# Patient Record
Sex: MALE | Race: BLACK OR AFRICAN AMERICAN | Employment: FULL TIME | URBAN - METROPOLITAN AREA
[De-identification: names, ages, dates, MRNs, and addresses within clinical notes are randomized per-mention and may not be internally consistent; named-entity substitution may affect disease eponyms.]

---

## 2017-08-29 ENCOUNTER — HOSPITAL ENCOUNTER (OUTPATIENT)
Age: 52
Discharge: HOME | End: 2017-08-29
Payer: COMMERCIAL

## 2017-08-29 ENCOUNTER — HOSPITAL ENCOUNTER (OUTPATIENT)
Dept: GENERAL RADIOLOGY | Age: 52
Discharge: HOME | End: 2017-08-29
Payer: COMMERCIAL

## 2017-08-29 DIAGNOSIS — T14.90XA INJURY: ICD-10-CM

## 2017-08-29 PROCEDURE — 73130 X-RAY EXAM OF HAND: CPT

## 2018-07-31 ENCOUNTER — HOSPITAL ENCOUNTER (OUTPATIENT)
Dept: PHYSICAL THERAPY | Age: 53
Setting detail: THERAPIES SERIES
Discharge: HOME | End: 2018-07-31
Payer: COMMERCIAL

## 2018-07-31 PROCEDURE — G0283 ELEC STIM OTHER THAN WOUND: HCPCS

## 2018-07-31 PROCEDURE — 97162 PT EVAL MOD COMPLEX 30 MIN: CPT

## 2018-07-31 PROCEDURE — 97110 THERAPEUTIC EXERCISES: CPT

## 2018-07-31 NOTE — CONSULTS
[x] Rina Pj        Outpatient Physical                Therapy       [address]       Phone: [phone]       Fax: [phone]       Physical Therapy Spine Evaluation    Date:  2018  Patient: Chacha Fountain  : 1965  MRN: [id number]  Physician: Daniel 95: WC (6 Rx approved)  Medical Diagnosis: Low Back Strain S39.012  Rehab Codes: M54.5, M54.6, R26.2, R29.3  Onset Date: 2018  Next 's appt.: Friday 8/3/2018    Subjective:   CC: Pt injured back at work yesterday, was pulling up on gopher (approx 50lbs), went to Dr later that afternoon, Dr prescribed flexeril and ibuprofen, Pt took last night, woke up this morning with excruciating pain. Pain is in R low back, shoots into R side, and around to front. Pain increases when bends over, and when tries to get up after sitting for awhile, up to 10/10. Pt has tried using ice without relief. Pain decreases with meds and laying down.     HPI: (onset date) 2018    PMHx: [] Unremarkable [] Diabetes [] HTN  [] Pacemaker   [] MI/Heart Problems [] Cancer [x] Arthritis   [x] Other:RC repair with scope ; hernia repair;               [x] Refer to full medical chart  In EPIC   Tests: [] X-Ray: [] MRI:  [] Other:    Medications: [x] Refer to full medical record [] None [] Other:  Allergies:      [] Refer to full medical record [] None [x] Other: Bee Stings    Function:  Hand Dominance  [x] Right  [] Left  Working:  [] Normal Duty  [x] Light Duty  [] Off D/T Condition  [] Retired  [] Not Employed                  []  Disability  [] Other:           Return to work:   Job/ADL Description: [employer] physical job, wrenching, lifting, digging, bending; currently light duty-no lifting, bending  Pain:  [x] Yes  [] No Location: R low back Pain Rating: (0-10 scale) 8/10 w/meds  Pain altered Tx:  [] Yes  [x] No  Action:  Symptoms:  [] Improving [x] Worsening [] Same  Better:  [] AM    [] PM    [] Sit    [] Deviations  [] Other:    STG: (to be met in 6 treatments)  1. ? Pain: R low back 7/10 at worst, 5/10 on average  2. ? ROM: Lumbar flex 50°, ext 20° without increased pain  3. ? Strength: B hips 4/5, B knees 4+/5, B ankles 5/5; Spinal stab muscles as evidence by ability to perform moderate spinal stab ex program   4. ? Function: Oswestry score 50% loss of function  5. Independent with Home Exercise Programs      Patient goals: \"Go back to regular work habits\"      Rehab Potential:  [x] Good  [] Fair  [] Poor   Suggested Professional Referral:  [x] No  [] Yes:  Barriers to Goal Achievement[de-identified]  [x] No  [] Yes:  Domestic Concerns:  [x] No  [] Yes:    Pt. Education:  [x] Plans/Goals, Risks/Benefits discussed  [] Home exercise program  Method of Education: [x] Verbal  [] Demo  [] Written  Comprehension of Education:  [x] Verbalizes understanding. [] Demonstrates understanding. [] Needs Review. [] Demonstrates/verbalizes understanding of HEP/Ed previously given. Treatment Plan:  [x] Therapeutic Exercise    [x] Modalities:  [x] Therapeutic Activity    [x] Ultrasound  [x] Electrical Stimulation  [] Gait Training     []Massage       [x] Lumbar/Cervical Traction  [x] Neuromuscular Re-education [x] Cold/hotpack [] Iontophoresis: 4 mg/mL  [x] Instruction in HEP             Dexamethasone Sodium  [x] Manual Therapy             Phosphate 40-80 mAmin  [] Aquatic Therapy   [] Dry Needling [] Other:    []  Medication allergies reviewed for use of    Dexamethasone Sodium Phosphate 4mg/ml     with iontophoresis treatments. Pt is not allergic.     Frequency:  3 x/week for 6 visits    Todays Treatment:  Modalities: CP (large) ES (IFC opiate) in supine w/purple wedge, at end of Rx  Precautions:  Exercises:  Exercise Reps/ Time Weight/ Level Comments   Supine      abdom louie 10x 5sec    Glut sets  10x     Alt UE raises [id number]  W/abdom louie   March  15x  W/abdom louie         Other:    Specific Instructions for next treatment: add

## 2018-08-01 ENCOUNTER — HOSPITAL ENCOUNTER (OUTPATIENT)
Dept: PHYSICAL THERAPY | Age: 53
Setting detail: THERAPIES SERIES
Discharge: HOME | End: 2018-08-01
Payer: COMMERCIAL

## 2018-08-01 PROCEDURE — G0283 ELEC STIM OTHER THAN WOUND: HCPCS

## 2018-08-01 PROCEDURE — 97110 THERAPEUTIC EXERCISES: CPT

## 2018-08-01 NOTE — FLOWSHEET NOTE
[] [facility]       Outpatient Physical        Therapy       [address]       Phone: [phone]       Fax: [phone] [] [facility] for Health Promotion at [address] [facility]       Phone: [phone]       Fax: [phone] [] [facility] for [facility] Promotion  [address] [facility]   Phone: [phone]   Fax:  [phone]     Physical Therapy Daily Treatment Note    Date:  2018  Patient Name:  Estuardo Cheung    :  1965  MRN: [id number]  Physician: Dayna Merchant Ave: BRAYDEN (6 Rx approved)  Medical Diagnosis: Low Back Strain S39.012                     Rehab Codes: M54.5, M54.6, R26.2, R29.3  Onset Date: 2018                      Next 's appt.: Friday 8/3/2018  Visit# / total visits: 2/6  Cancels/No Shows: 0/0    Subjective:    Pain:  [x] Yes  [] No           Location: R low back   Pain Rating: (0-10 scale) 8/10   Pain altered Tx:  [] Yes  [x] No  Action:  Comments: Patient reports pain is still significant in lower back, R side more than L. Feels it is more than just a muscle issue, feels it is something deeper. Following initial eval pt states symptoms were improved following appt but then set on later at night once he was up and moving around again.      Objective:  Modalities: CP (large) ES (IFC opiate) in supine w/purple wedge, at end of Rx x 15min  Precautions:  Exercises:  Exercise Reps/ Time Weight/ Level Comments    SciFit 5 min L1  x          Supine       TrA iso 10x5\"   x   Glut set 10x3\"   x   Alt UE 15x  With iso abdom x   Alt march 15x  iso abdom x   Alt UE/LE opp side 15x  iso abdom x   Bridge 10x   x   HS stretch 3x20\"  With belt x   SKTC 10x3\"   x   LTR 10x   x   Hip adduction 10x3\"  Yellow ball x          Seated        March 10x ea  With TrA iso, add next                         Other:    Specific Instructions for next treatment:    Treatment Charges: Mins Units   [x]

## 2018-08-03 ENCOUNTER — HOSPITAL ENCOUNTER (OUTPATIENT)
Dept: PHYSICAL THERAPY | Age: 53
Setting detail: THERAPIES SERIES
Discharge: HOME | End: 2018-08-03
Payer: COMMERCIAL

## 2018-08-03 PROCEDURE — 97140 MANUAL THERAPY 1/> REGIONS: CPT

## 2018-08-03 PROCEDURE — 97110 THERAPEUTIC EXERCISES: CPT

## 2018-08-03 PROCEDURE — G0283 ELEC STIM OTHER THAN WOUND: HCPCS

## 2018-08-03 NOTE — FLOWSHEET NOTE
[x] [facility]       Outpatient Physical        Therapy       [address]       Phone: [phone]       Fax: [phone] [] [facility] for Health Promotion at [address] [facility]       Phone: [phone]       Fax: [phone] [] [facility] [place] for Health Promotion  [address] [facility]   Phone: [phone]   Fax:  [phone]     Physical Therapy Daily Treatment Note    Date:  8/3/2018  Patient Name:  Sharif Hendrickson    :  1965  MRN: [id number]  Physician: Dayna Merchant Ave: BRAYDEN (6 Rx approved)  Medical Diagnosis: Low Back Strain S39.012                     Rehab Codes: M54.5, M54.6, R26.2, R29.3  Onset Date: 2018                      Next 's appt.: Friday 8/3/2018  Visit# / total visits: 3/6  Cancels/No Shows: 0/0    Subjective:    Pain:  [x] Yes  [] No           Location: R low back   Pain Rating: (0-10 scale) 5/10   Pain altered Tx:  [] Yes  [x] No  Action:  Comments: Patient reports increased pain in the morning and with rotational movements. Pt states that pain is neither improving or getting worse. Objective:  Modalities: CP (large) ES (IFC opiate) in supine w/purple wedge, at end of Rx x 15min  Precautions:  Exercises:  Exercise Reps/ Time Weight/ Level Comments    SciFit 6 min L2.6  x          Supine       TrA iso 10x10\"   x   Glut set 10x3\"   x   Alt UE 15x  With iso abdom x   Alt march 15x  iso abdom x   Alt UE/LE opp side 15x  iso abdom x   Bridge 15x 2\" hold  x   HS stretch 3x30\"   x   SKTC/ DKTC 10x3\"   x   LTR 10x ea 5\" hold  x   Hip adduction 10x3\"  Yellow ball x          PRONE        Prone Lying 2'   x   Hip Extension 10x ea  With TrA iso, add next x   Prone press ups 10x2\"   x                 Manual Tx: Pelvic Traction in hooklying with mobility belt 5x45\", BLE elevated.  Posterior/ Anterior Lumbar Spine mobilization    Specific Instructions for next treatment: Increase

## 2018-08-06 ENCOUNTER — HOSPITAL ENCOUNTER (OUTPATIENT)
Dept: PHYSICAL THERAPY | Age: 53
Setting detail: THERAPIES SERIES
Discharge: HOME | End: 2018-08-06
Payer: COMMERCIAL

## 2018-08-06 PROCEDURE — 97012 MECHANICAL TRACTION THERAPY: CPT

## 2018-08-06 PROCEDURE — 97110 THERAPEUTIC EXERCISES: CPT

## 2018-08-06 NOTE — FLOWSHEET NOTE
[x] A[facility]       Outpatient Physical        Therapy       [address]       Phone: [phone]       Fax: [phone]     Physical Therapy Daily Treatment Note    Date:  2018  Patient Name:  Kerri Arzola      :  1965    MRN: [id number]  Physician: Ondina [place]: WC (6 Rx approved)  Medical Diagnosis: Low Back Strain S39.012                       Rehab Codes: M54.5, M54.6, R26.2, R29.3  Onset Date: 2018                      Next 's appt.: Friday 8/10/2018    Visit# / total visits: 4/6  Cancels/No Shows: 0/0    Subjective:    Pain:  [x] Yes  [] No           Location: R low back     Pain Rating: (0-10 scale) 5/10   Pain altered Tx:  [] Yes  [x] No  Action:  Comments: Patient states low back pain has been waxing and waning. Felt good this morning, but after work, pain is again elevated. Objective:  Modalities: CP (large) ES (IFC opiate) in supine w/purple wedge, at end of Rx x 15min  Precautions:  Exercises:   Exercise Reps/ Time Weight/ Level Comments    SciFit 6 min L2.6  x          Supine       TrA iso 10x10\"   x   Glut set 10x3\"   x   Alt UE/LE Same Side 15x 2 lb With iso abdom x   Marching 15x 2 lb iso abdom x   Alt UE/LE opp side 15x 2 lb iso abdom x   Bridge 15x 2\" hold  x   HS stretch w/belt 3x30\"   x   SKTC/ DKTC 5x5\" ea   x   LTR 10x ea 5\" hold  x   Hip adduction 20x3\"  Yellow ball x          PRONE        Prone Lying 2'   x   Hip Extension 10x ea  With TrA iso, add next x   Prone press ups 10x2\"   x                 Manual Tx: Pelvic Traction in hooklying with mobility belt 5x45\", BLE elevated.  Posterior/ Anterior Lumbar Spine mobilization    Specific Instructions for next treatment: Increase core stabilization exercises,; standing (squats/4-way hip)    Treatment Charges: Mins Units   [x]  Modalities IFC/CP     [x]  Ther Exercise 30 2   [x]  Manual Therapy     []  Ther Activities     []  Aquatics     []  Vasocompression

## 2018-08-07 ENCOUNTER — HOSPITAL ENCOUNTER (OUTPATIENT)
Dept: PHYSICAL THERAPY | Age: 53
Setting detail: THERAPIES SERIES
Discharge: HOME | End: 2018-08-07
Payer: COMMERCIAL

## 2018-08-07 PROCEDURE — 97110 THERAPEUTIC EXERCISES: CPT

## 2018-08-07 PROCEDURE — 97012 MECHANICAL TRACTION THERAPY: CPT

## 2018-08-09 ENCOUNTER — HOSPITAL ENCOUNTER (OUTPATIENT)
Dept: PHYSICAL THERAPY | Age: 53
Setting detail: THERAPIES SERIES
Discharge: HOME | End: 2018-08-09
Payer: COMMERCIAL

## 2018-08-09 PROCEDURE — 97012 MECHANICAL TRACTION THERAPY: CPT

## 2018-08-09 PROCEDURE — 97110 THERAPEUTIC EXERCISES: CPT

## 2018-08-09 NOTE — FLOWSHEET NOTE
[x] [facility]       Outpatient Physical        Therapy       [address]       Phone: [phone]       Fax: [phone]     Physical Therapy Daily Treatment Note    Date:  2018  Patient Name:  Laura Gallardo      :  1965    MRN: [id number]  Physician: Ondina [place]: WC (6 Rx approved)  Medical Diagnosis: Low Back Strain S39.012                       Rehab Codes: M54.5, M54.6, R26.2, R29.3  Onset Date: 2018                      Next 's appt.: Friday 8/10/2018    Visit# / total visits: 6/  Cancels/No Shows: 0/0    Subjective:    Pain:  [x] Yes  [] No           Location: R low back     Pain Rating: (0-10 scale) 3/10   Pain altered Tx:  [] Yes  [x] No  Action:  Comments: Sees  tomorrow. Reports he still wakes up in pain. Has to take pain pill to take pain way, but the decrease in pain is slowly improving overall. Reports compliance with HEP.       Objective:  Modalities: CP (large) ES (IFC opiate) in supine w/purple wedge, at end of Rx x 15 min held 18  Mechanical Lumbar Traction: 80 lbs max, 20 lbs min, 30 sec on/10 sec off, 2 steps 100% speed  Precautions:  Exercises:   Exercise Reps/ Time Weight/ Level Comments    SciFit 6 min L2.6  X          Standing        4-Way Hip 12x ea Red Added 18 X          Total Gym       Squats 2x10  Blue tband around knees  Added 18 X          Supine       TrA iso 20x5\"   X   Glut set 20x5\"   X   Alt UE/LE Same Side 2x10 2 lb Increased reps 18    Alt UE/LE opp side 2x10 2 lb Increased reps 18    Bridge 2x10 2\" hold     HS stretch w/belt 3x30\"      SKTC/ DKTC 5x15\" ea      LTR 10x ea 5\" hold     Hip adduction 20x3\"  Yellow ball    SLR 15x 2 lb Added 18           Sidelying        Hip Abduction  15x 2 lb Added 18           PRONE        MATTHEW 3 min   X   Alt Hip Extension 15x ea  With TrA iso, add next X   Prone press ups 2x10 3 sec hold  X                 Manual Tx:     Specific Instructions for next treatment: Increase core stabilization exercises,; address STG's    Treatment Charges: Mins Units   [x]  Modalities IFC/CP     [x]  Ther Exercise 40 3   [x]  Manual Therapy     []  Ther Activities     []  Aquatics     []  Vasocompression     [x]  Lumbar Traction 20 1   Total Treatment time 60 4        Assessment: [x] Progressing toward goals: Good review of exercise program this date. Pt demos good recall of routine. Pt still demonstrates some functional deficits as indicted by mm weakness and as indicated on Oswestry. Pt would benefit from continued PT to address these deficits. Pt educated to stop by clinic after visit with MD and inform us whether he will be released from PT or not. [] No change. [x] Other:    STG: (to be met in 6 treatments)  1. ? Pain: R low back 7/10 at worst, 5/10 on average PROGRESSING over last week for max pain (8/10) average pain is at 3-4/10 over last week  2. ? ROM: Lumbar flex 50°, ext 20° without increased pain  MET 60° 20°  3. ? Strength: B hips 4/5, B knees 4+/5, B ankles 5/5; Spinal stab muscles as evidence by ability to perform moderate spinal stab ex program progress made 4/5 grossly for hips and knees, ankles 4+/5  4. ? Function: Oswestry score 50% loss of function  MET 48%  5. Independent with Home Exercise Programs MET        Patient goals: \"Go back to regular work habits\"    Pt. Education:  [x] Yes  [] No  [x] Reviewed Prior HEP/Ed  Method of Education: [x] Verbal  [] Demo  [x] Written  Comprehension of Education:  [x] Verbalizes understanding. [] Demonstrates understanding. [] Needs review. [x] Demonstrates/verbalizes HEP/Ed previously given  8/1/18 - issued written HEP for glut iso, SKTC, HS stretch, TrA iso  Re-issued previous HEP, prone press ups, prone hip extension,  DLS (marching, alt opp UE/LE, SLR, bridge, heel walkouts, sidelying hip abduction) standing 4-way hip with green tband     Plan: [x] Continue per plan of care.    [] Other:     Frequency:  3 x/week for 6 visits     Time In: 1231           Time Out: 7208    Electronically signed by:  Ariana Bartholomew PTA

## 2018-08-21 ENCOUNTER — HOSPITAL ENCOUNTER (OUTPATIENT)
Age: 53
Discharge: HOME | End: 2018-08-21
Payer: COMMERCIAL

## 2019-03-15 ENCOUNTER — HOSPITAL ENCOUNTER (OUTPATIENT)
Dept: PHYSICAL THERAPY | Age: 54
Setting detail: THERAPIES SERIES
Discharge: HOME | End: 2019-03-15
Payer: COMMERCIAL

## 2019-03-15 PROCEDURE — 97110 THERAPEUTIC EXERCISES: CPT

## 2019-03-15 PROCEDURE — 97162 PT EVAL MOD COMPLEX 30 MIN: CPT

## 2019-03-19 ENCOUNTER — HOSPITAL ENCOUNTER (OUTPATIENT)
Dept: PHYSICAL THERAPY | Age: 54
Setting detail: THERAPIES SERIES
Discharge: HOME | End: 2019-03-19
Payer: COMMERCIAL

## 2019-03-19 PROCEDURE — 97110 THERAPEUTIC EXERCISES: CPT

## 2019-03-20 ENCOUNTER — HOSPITAL ENCOUNTER (OUTPATIENT)
Dept: PHYSICAL THERAPY | Age: 54
Setting detail: THERAPIES SERIES
Discharge: HOME | End: 2019-03-20
Payer: COMMERCIAL

## 2019-03-20 PROCEDURE — 97110 THERAPEUTIC EXERCISES: CPT

## 2019-03-22 ENCOUNTER — HOSPITAL ENCOUNTER (OUTPATIENT)
Dept: PHYSICAL THERAPY | Age: 54
Setting detail: THERAPIES SERIES
Discharge: HOME | End: 2019-03-22
Payer: COMMERCIAL

## 2019-03-22 PROCEDURE — G0283 ELEC STIM OTHER THAN WOUND: HCPCS

## 2019-03-22 PROCEDURE — 97110 THERAPEUTIC EXERCISES: CPT

## 2019-03-26 ENCOUNTER — HOSPITAL ENCOUNTER (OUTPATIENT)
Dept: PHYSICAL THERAPY | Age: 54
Setting detail: THERAPIES SERIES
Discharge: HOME | End: 2019-03-26
Payer: COMMERCIAL

## 2019-03-26 PROCEDURE — G0283 ELEC STIM OTHER THAN WOUND: HCPCS

## 2019-03-26 PROCEDURE — 97110 THERAPEUTIC EXERCISES: CPT

## 2019-03-27 ENCOUNTER — HOSPITAL ENCOUNTER (OUTPATIENT)
Dept: PHYSICAL THERAPY | Age: 54
Setting detail: THERAPIES SERIES
Discharge: HOME | End: 2019-03-27
Payer: COMMERCIAL

## 2019-03-27 PROCEDURE — G0283 ELEC STIM OTHER THAN WOUND: HCPCS

## 2019-03-27 PROCEDURE — 97110 THERAPEUTIC EXERCISES: CPT

## 2019-03-29 ENCOUNTER — HOSPITAL ENCOUNTER (OUTPATIENT)
Dept: PHYSICAL THERAPY | Age: 54
Setting detail: THERAPIES SERIES
Discharge: HOME | End: 2019-03-29
Payer: COMMERCIAL

## 2019-04-02 ENCOUNTER — HOSPITAL ENCOUNTER (OUTPATIENT)
Dept: PHYSICAL THERAPY | Age: 54
Setting detail: THERAPIES SERIES
Discharge: HOME | End: 2019-04-02
Payer: COMMERCIAL

## 2019-04-02 PROCEDURE — 97110 THERAPEUTIC EXERCISES: CPT

## 2019-04-02 PROCEDURE — G0283 ELEC STIM OTHER THAN WOUND: HCPCS

## 2019-04-02 NOTE — FLOWSHEET NOTE
[x] [facility]) [facility] &  Therapy  [address]  [phone]  F: [phone] [] [address]  [facility]   [address]  P: [phone]  F: [phone] [] [place]  [address] [facility]  P: [phone]  F: [phone] [] [address]  [facility]   [address]  [place]: [phone]  F: [phone]     Physical Therapy Daily Treatment Note    Date:  2019  Patient Name:  Herbert Tatum    :  1965  MRN: [id number]  Physician: Dr. Lauren Herrera: [employer] ([place]- 3x week/x3 week- 9 visits total; no date range)  Medical Diagnosis: S39.012             Rehab Codes: S39.012  Onset Date: 3/14/2019                      Next 's appt. : 3/28/2019  Visit# / total visits: 7/9  Cancels/No Shows: 0/0    Subjective:    Pain:  [x] Yes  [] No Location: low back Pain Rating: (0-10 scale) 4/10  Pain altered Tx:  [x] No  [] Yes  Action:  Comments: States that his back actually feels a little better this afternoon.        Objective:  Modalities: 1 large HP and UES to lower back with pt supine and legs propped on wedge after exercises  Precautions: [employer] ([facility]- 3x week/x3 week- 9 visits total; no date range); light duty upon RTW 3/27/2019  Exercises:  Exercise Reps/ Time Weight/ Level Comments   Supine      iso abdominal  10x 5 sec    SKTC   10x 5 sec    Piriformis stretch   10x 5 sec    Marching   10x 2lb iso abd   LTR 10x 5 sec    Bridges   15x     SLR    10x 2lb    Opposite UE/LE    10x 2/2lb    Hamstring stretch   3xea 30\"    Manual piriformis stretch   Therapist         Prone            MATTHEW 45\" x3     Prayer stretch 3x 15 sec    Prayer stretch hands to right 3x 15\"    Prone alt leg lifts 10xea 2lb    Angry cat/Cow stretch 5xea 5\"          Cybex

## 2019-04-03 ENCOUNTER — HOSPITAL ENCOUNTER (OUTPATIENT)
Dept: PHYSICAL THERAPY | Age: 54
Setting detail: THERAPIES SERIES
Discharge: HOME | End: 2019-04-03
Payer: COMMERCIAL

## 2019-04-03 PROCEDURE — G0283 ELEC STIM OTHER THAN WOUND: HCPCS

## 2019-04-03 PROCEDURE — 97110 THERAPEUTIC EXERCISES: CPT

## 2019-04-03 NOTE — FLOWSHEET NOTE
[x] [facility]) [facility] &  Therapy  [address]  [phone]  F: [phone] [] [address]  [place] [address]   [address]  P: [phone]  F: [phone] [] [place]  [address]  P: [phone]  F: [phone] [] [address]  [facility]   [address]  [place]: [phone]  F: [phone]     Physical Therapy Daily Treatment Note    Date:  4/3/2019  Patient Name:  Caio Oakes    :  1965  MRN: [id number]  Physician: Dr. Daron Santana: [employer] ([address] [facility]- 3x week/x3 week- 9 visits total; no date range)  Medical Diagnosis: S39.012             Rehab Codes: S39.012  Onset Date: 3/14/2019                      Next 's appt. : 3/28/2019  Visit# / total visits: 8/9  Cancels/No Shows: 0/0    Subjective:    Pain:  [x] Yes  [] No Location: low back Pain Rating: (0-10 scale) 3-4/10  Pain altered Tx:  [x] No  [] Yes  Action:  Comments: States that his pain is getting better. Reports having new onset of left-sided flank pain this afternoon.       Objective:  Modalities: 1 large HP and UES to lower back with pt supine and legs propped on wedge after exercises  Precautions: [employer] ([facility]- 3x week/x3 week- 9 visits total; no date range); light duty upon RTW 3/27/2019  Exercises:  Exercise Reps/ Time Weight/ Level Comments   Supine      iso abdominal  10x 5 sec    SKTC   10x 5 sec    Piriformis stretch   10x 5 sec    Marching   10x 2lb iso abd   LTR 10x 5 sec    Bridges   15x     SLR    10x 2lb    Opposite UE/LE    10x 2/2lb    Hamstring stretch   3xea 30\"    Manual piriformis stretch   Therapist         Prone            MATTHEW 45\" x3     Prayer stretch 3x 15 sec    Prayer stretch hands to right 3x 15\"    Prone alt leg lifts 10xea 2lb    Angry cat/Cow stretch 5xea 5\"          Cybex back ext 2x15 2plates    Standing hip glides  10x 5\" Shift hips to right   Other:    Specific Instructions for next treatment: Reassessment next session    Treatment Charges: Mins Units   [x]  Modalities--HP/UES 15/15 0/1   [x]  Ther Exercise 35 2   []  Manual Therapy     []  Ther Activities     []  Aquatics     []  Vasocompression     []  Other     Total Treatment time 50 mins 3       Assessment: [x] Progressing toward goals. Pt moving around the clinic with minimal to no guarding now. Pt also progressing well with DLS exercises. [] No change. [] Other:     STG: (to be met in 9  treatments)  1. ? Pain: Patient will report 3/10 pain at rest and < 5/10 pain with active movement in order to improve QOL, work tolerance. 2. ? ROM: Patient will demonstrate 50% forward lumbar flexion; beyond neutral lumbar extension with < 5/10 pain for improved mobility, ADLs, work tasks. 3. ? Strength: Patient will demonstrate 4/5 L hip flexor, 4+/5 L quadriceps strength with < 5/10 pain for improved ADLs, work tolerance. 4. ? Function: Patient will report < 5/10 pain with 15 minutes of sitting, standing, ambulation for improved work tolerance. 5. Independent with Home Exercise Programs--MET  6. Demonstrate Knowledge of fall prevention  7. Improve modified oswestry from 70% to 50%     Patient goals: \"get rid of the pain. \"    Pt. Education:  [x] Yes  [] No  [x] Reviewed Prior HEP/Ed  Method of Education: [x] Verbal  [x] Demo  [] Written  Comprehension of Education:  [x] Verbalizes understanding. [x] Demonstrates understanding. [x] Needs review. [] Demonstrates/verbalizes HEP/Ed previously given. Plan: [x] Continue per plan of care.    [] Other:      Time In: 3:38 pm            Time Out: 4:26 pm    Electronically signed by:  Colton Nieves, PT

## 2019-04-05 ENCOUNTER — HOSPITAL ENCOUNTER (OUTPATIENT)
Dept: PHYSICAL THERAPY | Age: 54
Setting detail: THERAPIES SERIES
Discharge: HOME | End: 2019-04-05
Payer: COMMERCIAL

## 2019-04-05 NOTE — FLOWSHEET NOTE
[x] [address]  [address]    [phone]  F: [phone]   [] [address] [facility] [address]   [address]  P: [phone]  F: [phone]  [] [address]  [address] [facility]  P: [phone]  F: [phone]   [] [address]  [facility] [address]   [place]: [phone]  F: [phone]  [] [place] [id number] [facility] [address]  [place]: [phone]   F: [phone]     Physical Therapy Cancel/No Show note    Date: 2019  Patient: Dangelo Davidson  : 1965  MRN: [id number]    Cancels/No Shows to date:     For today's appointment patient:    []  Cancelled    [] Rescheduled appointment    [x] No-show     Reason given by patient:    []  Patient ill    []  Conflicting appointment    [] No transportation      [] Conflict with work    [] No reason given    [] Weather related    [] Other:      Comments: Called pt, he was very apologetic stating that he completely forgot about the appointment. Pt went back to the doctor yesterday and was released to go back to full duty at work after his vacation. Pt instructed to call to reschedule this appointment after his vacation if he'd like to make it up.           [] Next appointment was confirmed    Electronically signed by: Yandel Lo PT

## 2019-04-23 NOTE — DISCHARGE SUMMARY
quadriceps strength with < 5/10 pain for improved ADLs, work tolerance.   4. ? Dorean Led will report < 5/10 pain with 15 minutes of sitting, standing, ambulation for improved work tolerance. 5. Independent with Home Exercise Programs--MET  6. Demonstrate Knowledge of fall prevention  7. Improve modified oswestry from 70% to 50%        Treatment to Date:  [x] Therapeutic Exercise    [x] Modalities:  [] Therapeutic Activity    [] Ultrasound  [x] Electrical Stimulation  [] Gait Training     [] Massage       [] Lumbar/Cervical Traction  [] Neuromuscular Re-education [x] Cold/hotpack [] Iontophoresis: 4 mg/mL  [x] Instruction in Home Exercise Program                     Dexamethasone Sodium  [] Manual Therapy             Phosphate 40-80 mAmin  [] Aquatic Therapy                   [] Vasocompression/    [] Other:             Game Ready    Discharge Status:     [] Pt recovered from conditions. Treatment goals were met. [] Pt received maximum benefit. No further therapy indicated at this time. [] Pt to continue exercise/home instructions independently. [] Therapy interrupted due to:    [] Pt has 2 or more no shows/cancels, is discontinued per our policy. [] Pt has completed prescribed number of treatment sessions. [x] Other: Pt completed 8/9 sessions. Pt no-showed for his last scheduled appt on 4/5/19--called and spoke with the pt and he was very apologetic for missing, explained that he had just forgotten about the appt. Pt was leaving the next day for vacation for a week. Instructed the pt to call when he returned from vacation if he would like to reschedule his last appointment but that if he was doing well that it was not necessary. Electronically signed by Yandel Lo PT on 4/23/2019 at 8:19 AM      If you have any questions or concerns, please don't hesitate to call.   Thank you for your referral.

## 2019-06-18 ENCOUNTER — HOSPITAL ENCOUNTER (OUTPATIENT)
Dept: GENERAL RADIOLOGY | Age: 54
Discharge: HOME | End: 2019-06-20
Payer: COMMERCIAL

## 2019-06-18 ENCOUNTER — HOSPITAL ENCOUNTER (OUTPATIENT)
Age: 54
Discharge: HOME | End: 2019-06-18
Payer: COMMERCIAL
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
## 2019-06-18 PROCEDURE — 73562 X-RAY EXAM OF KNEE 3: CPT

## 2019-06-26 ENCOUNTER — HOSPITAL ENCOUNTER (OUTPATIENT)
Dept: MRI IMAGING | Facility: CLINIC | Age: 54
Discharge: HOME | End: 2019-06-28
Payer: COMMERCIAL

## 2019-06-26 DIAGNOSIS — T14.8XXA SPRAIN: ICD-10-CM

## 2019-06-26 PROCEDURE — 73721 MRI JNT OF LWR EXTRE W/O DYE: CPT

## 2019-07-16 DIAGNOSIS — M25.561 ACUTE PAIN OF RIGHT KNEE: Primary | ICD-10-CM

## 2019-07-18 ENCOUNTER — OFFICE VISIT (OUTPATIENT)
Dept: ORTHOPEDIC SURGERY | Age: 54
End: 2019-07-18
Payer: COMMERCIAL

## 2019-07-18 VITALS — HEIGHT: 73 IN | BODY MASS INDEX: 29.16 KG/M2 | WEIGHT: 220 LBS

## 2019-07-18 DIAGNOSIS — S83.91XA SPRAIN OF RIGHT KNEE, UNSPECIFIED LIGAMENT, INITIAL ENCOUNTER: Primary | ICD-10-CM

## 2019-07-18 PROCEDURE — 99243 OFF/OP CNSLTJ NEW/EST LOW 30: CPT | Performed by: ORTHOPAEDIC SURGERY

## 2019-07-18 RX ORDER — METHYLPREDNISOLONE 4 MG/1
TABLET ORAL
Qty: 1 KIT | Refills: 0 | Status: SHIPPED | OUTPATIENT
Start: 2019-07-18 | End: 2019-09-05

## 2019-07-18 RX ORDER — IBUPROFEN 800 MG/1
800 TABLET ORAL EVERY 6 HOURS PRN
COMMUNITY
End: 2021-05-11

## 2019-07-18 ASSESSMENT — ENCOUNTER SYMPTOMS
CONSTIPATION: 0
DIARRHEA: 0
NAUSEA: 0
COUGH: 0

## 2019-07-18 NOTE — PROGRESS NOTES
[facility]  [facility] ORTHO SPECIALISTS  [address] [place]  [address]  Dept: [phone]    Ambulatory Orthopedic Consult      CHIEF COMPLAINT:    Chief Complaint   Patient presents with    Knee Pain     right [facility]:6/18/2019       HISTORY OF PRESENT ILLNESS:      The patient is a 62 y.o. male who is being seen at the request of  Salvatore Hong DO for consultation and evaluation of  Right knee pain. Ariane Amin is a 62 y.o. male who presents to the office today with right knee pain after a work-related injury sustained on 6/18/19. Patient states he was coming down a ladder, which was in a hole, he went backwards with his knee bent. He states that his right foot was caught under him. Patient presented to Occupational health. A XR and MRI were completed. The patient was prescribed Ibuprofen 800mg PO TID. Patient states that he really only takes the medication in the morning and at night before bed. The patient states that the pain is located on the outside of the knee when he is bending. Patient states that occupational health, has him on light duty. He has a follow up with them on Monday (7/22/19). The patient does have a prior history of a right knee injury. Patient states that it was also work related. The injury was less than 5 years ago. He had a twisting injury to the right knee when coming down a ladder. Patient states that he did therapy and oral NSAIDS. Patient feels that he fully recovered from the injury and he was release back to work full duties. He never had problems in the right knee until this new injury. Past Medical History:    No past medical history on file. Past Surgical History:    No past surgical history on file.     Current Medications:   Current Outpatient Medications   Medication Sig Dispense Refill    ibuprofen (ADVIL;MOTRIN) 800 MG tablet Take 800 mg by mouth every 6 hours as needed for Pain      methylPREDNISolone (MEDROL DOSEPACK) 4 described above. Mri Knee Right Wo Contrast    Result Date: 6/26/2019  EXAMINATION: MRI OF THE RIGHT KNEE WITHOUT CONTRAST, 6/26/2019 10:33 am TECHNIQUE: Multiplanar multisequence MRI of the right knee was performed without the administration of intravenous contrast. COMPARISON: None. HISTORY: ORDERING SYSTEM PROVIDED HISTORY: Sprain TECHNOLOGIST PROVIDED HISTORY: Ordering Physician Provided Reason for Exam: Pt states right knee pain x 1 week pt states fall from ladder with right knee bending with force pain to lateral and posterior right knee Acuity: Acute Type of Exam: Initial Mechanism of Injury: Pt states right knee pain x 1 week pt states fall from ladder with right knee bending with force pain to lateral and posterior right knee FINDINGS: MENISCI: There is inner free edge truncation/degeneration of the medial meniscus. Multifocal high signal within the posterior horn of the medial meniscus without definite articular surface communication, most compatible with intrameniscal degeneration. The lateral meniscus is normal in bulk, contour, and signal intensity, without discrete tear. Harpreet Honour CRUCIATE LIGAMENTS: The anterior and posterior cruciate ligaments are normal in signal, morphology and course. EXTENSOR MECHANISM: Quadriceps tendon is intact. There is multifocal patellar tendinosis without definite tear identified. Prominent enthesophytes of the patella and tibial tubercle noted. LATERAL COLLATERAL LIGAMENT COMPLEX: The popliteus tendon, biceps femoris tendon, fibular collateral ligament and iliotibial band are intact. MEDIAL COLLATERAL LIGAMENT COMPLEX: The superficial and deep components of the medial collateral ligament are intact. KNEE JOINT: Multifocal full-thickness chondromalacia with subcortical marrow edema within the patellofemoral compartment primarily involving the patellar apex and medial patellar facet.   Multifocal partial-thickness chondral fissuring within the weight-bearing portion of the Medicine  7/19/2019 9:34 AM    This note is created with the assistance of a speech recognition program.  While intending to generate a document that actually reflects the content of the visit, the document can still have some errors including those of syntax and sound a like substitutions which may escape proof reading.  In such instances, actual meaning can be extrapolated by contextual diversion      Electronically signed by Whit Spann DO, FAOAO on 7/19/2019 at 9:33 AM

## 2019-08-05 ENCOUNTER — OFFICE VISIT (OUTPATIENT)
Dept: ORTHOPEDIC SURGERY | Age: 54
End: 2019-08-05
Payer: COMMERCIAL

## 2019-08-05 VITALS — WEIGHT: 220.02 LBS | HEIGHT: 73 IN | BODY MASS INDEX: 29.16 KG/M2

## 2019-08-05 DIAGNOSIS — S83.91XA SPRAIN OF RIGHT KNEE, UNSPECIFIED LIGAMENT, INITIAL ENCOUNTER: Primary | ICD-10-CM

## 2019-08-05 PROCEDURE — 20610 DRAIN/INJ JOINT/BURSA W/O US: CPT | Performed by: ORTHOPAEDIC SURGERY

## 2019-08-05 ASSESSMENT — ENCOUNTER SYMPTOMS
CHEST TIGHTNESS: 0
APNEA: 0
VOMITING: 0
COUGH: 0
ABDOMINAL PAIN: 0

## 2019-08-05 NOTE — PROGRESS NOTES
AM      I have reviewed and made changes accordingly to the work scribed by Milady Stroud LPN. The documentation accurately reflects work and decisions made by me. I have also reviewed documentation completed by clinical staff.     Huong Horn DO, [facility]  8/7/2019 11:45 AM    This note is created with the assistance of a speech recognition program.  While intending to generate a document that actually reflects the content of the visit, the document can still have some errors including those of syntax and sound a like substitutions which may escape proof reading.  In such instances, actual meaning can be extrapolated by contextual diversion      Electronically signed by Darshan Angulo on 8/7/2019 at 11:45 AM

## 2019-08-06 RX ORDER — METHYLPREDNISOLONE ACETATE 80 MG/ML
80 INJECTION, SUSPENSION INTRA-ARTICULAR; INTRALESIONAL; INTRAMUSCULAR; SOFT TISSUE ONCE
Status: COMPLETED | OUTPATIENT
Start: 2019-08-06 | End: 2019-08-06

## 2019-08-06 RX ORDER — BUPIVACAINE HYDROCHLORIDE 2.5 MG/ML
2 INJECTION, SOLUTION INFILTRATION; PERINEURAL ONCE
Status: COMPLETED | OUTPATIENT
Start: 2019-08-06 | End: 2019-08-06

## 2019-08-06 RX ADMIN — BUPIVACAINE HYDROCHLORIDE 5 MG: 2.5 INJECTION, SOLUTION INFILTRATION; PERINEURAL at 09:10

## 2019-08-06 RX ADMIN — METHYLPREDNISOLONE ACETATE 80 MG: 80 INJECTION, SUSPENSION INTRA-ARTICULAR; INTRALESIONAL; INTRAMUSCULAR; SOFT TISSUE at 09:10

## 2019-08-12 DIAGNOSIS — S83.91XA SPRAIN OF RIGHT KNEE, UNSPECIFIED LIGAMENT, INITIAL ENCOUNTER: Primary | ICD-10-CM

## 2019-08-14 ENCOUNTER — HOSPITAL ENCOUNTER (OUTPATIENT)
Dept: PHYSICAL THERAPY | Age: 54
Setting detail: THERAPIES SERIES
Discharge: HOME | End: 2019-08-14
Payer: COMMERCIAL

## 2019-08-14 PROCEDURE — 97161 PT EVAL LOW COMPLEX 20 MIN: CPT

## 2019-08-14 PROCEDURE — 97110 THERAPEUTIC EXERCISES: CPT

## 2019-08-14 PROCEDURE — 97016 VASOPNEUMATIC DEVICE THERAPY: CPT

## 2019-08-14 NOTE — CONSULTS
[x] [facility]  Outpatient Physical Therapy  [address]  Phone: [phone]  Fax: [phone] [] [facility] at [address] [place] [address]  Phone: [phone]   Fax: [phone]     Physical Therapy Evaluation    Date:  2019  Patient: Pebbles Soliman  : 1965  MRN: [id number]  Physician: Estefania Lester DO Insurance: [facility] 12 visits 19- 19  Medical Diagnosis: Sprain of right knee S83.8X1D, Unspecified injury of other muscles and tendons of posterior muscle group at lower leg level, right leg, S86.101D    Rehab Codes: M25.661, M25.561  Onset Date: 19                               Next 's appt:     Subjective:   CC: Patient reports R knee pain and stiffness. Currently having difficult ambulating long distances, climbing, and squatting. HPI: Patient fell off ladder into a large hole hyperflexing his right knee up under himself. PMHx: [] Unremarkable [] Diabetes [] HTN  [] Pacemaker   [] MI/Heart Problems [] Cancer [] Arthritis [] Asthma                         [x] refer to full medical chart  In [facility]  [] Other:        Tests: [] X-Ray: [x] MRI: 18  [] Other:  Inner free edge degeneration and intrameniscal degeneration involving the   body to posterior horn of the medial meniscus.  No definite acute meniscal   tear identified.       No lateral meniscal tear.  No cruciate or collateral ligamentous tear.       Multifocal patellar tendinosis.  Prominent enthesophyte formation of the   patella and tibial tubercle.       Multifocal grade 4 chondromalacia of the patellofemoral compartment,   primarily involving the patellar apex/medial patellar facet.       Partial-thickness chondral fissuring of the medial compartment.        Medications: [x] Refer to full medical record [] None [] Other:  Allergies:      [x] Refer to full medical record [] None [] Other:    Working: Full time  Job/ADL Description: [address] [place] [address] understanding. [x] Demonstrates understanding. [x] Needs Review. [] Demonstrates/verbalizes understanding of HEP/Ed previously given. Treatment Plan:  [x] Therapeutic Exercise   07990  [x] Vasopneumatic cold with compression  13391    [x] Therapeutic Activity  73848 [x] Cold/hotpack    [x] Gait Training   19951 [] Lumbar/Cervical Traction  [id number]   [x] Neuromuscular Re-education  13051 [] Electrical Stimulation Unattended  63241   [x] Manual Therapy  [] Dry Needling  33710 [] Electrical Stimulation Attended  71384   [] Iontophoresis: 4 mg/mL Dexamethasone Sodium Phosphate  mAmin  64620 []  Medication allergies reviewed for use of   Dexamethasone Sodium Phosphate 4mg/ml with iontophoresis treatments. Pt is not allergic. Frequency:  2 x/week for 12 visits    Todays Treatment:  Precautions:  Modalities: Vasopneumatic cryotherapy device (NewsPin) to R knee at med. pressure and 38 degrees x 15 min  Exercises:  Exercise Reps/ Time Weight/ Level Comments   LAQ 10x     SLR 10x     HS stretch w/ belt 3x30\"     Quad stretch w/ belt 3x30\"     Seated HS curls 10x     SAQ 10x     Other:    Specific Instructions for next treatment[de-identified] R knee ROM and strengthening,Work simulation when appropriate.        Evaluation Complexity:  History (Personal factors, comorbidities) [] 0 [] 1-2 [x] 3+   Exam (limitations, restrictions) [] 1-2 [x] 3 [] 4+   Clinical presentation (progression) [x] Stable [] Evolving  [] Unstable   Decision Making [x] Low [] Moderate [] High    [x] Low Complexity [] Moderate Complexity [] High Complexity       Treatment Charges: Mins Units   [x] Evaluation       [x]  Low       []  Moderate       []  High 20 1   []  Modalities     [x]  Ther Exercise 15 1   []  Manual Therapy     []  Ther Activities     []  Aquatics     [x]  Vasocompression 15 1   []  Other       TOTAL TREATMENT TIME: 30    Time in:1650     Time out:1740    Electronically signed by: Darius Gallego, PT        Physician

## 2019-08-19 ENCOUNTER — HOSPITAL ENCOUNTER (OUTPATIENT)
Dept: PHYSICAL THERAPY | Age: 54
Setting detail: THERAPIES SERIES
Discharge: HOME | End: 2019-08-19
Payer: COMMERCIAL

## 2019-08-19 PROCEDURE — 97110 THERAPEUTIC EXERCISES: CPT

## 2019-08-21 ENCOUNTER — HOSPITAL ENCOUNTER (OUTPATIENT)
Dept: PHYSICAL THERAPY | Age: 54
Setting detail: THERAPIES SERIES
Discharge: HOME | End: 2019-08-21
Payer: COMMERCIAL

## 2019-08-21 PROCEDURE — 97110 THERAPEUTIC EXERCISES: CPT

## 2019-08-21 PROCEDURE — 97016 VASOPNEUMATIC DEVICE THERAPY: CPT

## 2019-08-21 NOTE — FLOWSHEET NOTE
appropriate    Treatment Charges: Mins Units   []  Modalities     [x]  Ther Exercise 30 2   []  Manual Therapy     []  Ther Activities     []  Aquatics     [x]  Vasocompression 10 1   []  Other     Total Treatment time 40 3       Assessment: [x] Progressing toward goals. Patient reports increased patellar pain with heel taps Held leg press and deep squatting due to increased knee pain. Added GameReady for pain and inflammation control. [] No change. [] Other:        STG: (to be met in 12 treatments)  1. ? Pain: 1/10 R knee pain with ambulation. 2. ? ROM: R knee flexion 0-125 degrees to resolve gait deviations. 3. ? Strength: R knee flexion and extension 5/5.   4. ? Function: LEFI to 75% to return to work full duty and prevent future injury. 5. Independent with Home Exercise Programs    Pt. Education:  [x] Yes  [] No  [x] Reviewed Prior HEP/Ed  Method of Education: [x] Verbal  [x] Demo  [x] Written  Comprehension of Education:  [x] Verbalizes understanding. [x] Demonstrates understanding. [x] Needs review. [] Demonstrates/verbalizes HEP/Ed previously given. Plan: [x] Continue per plan of care.    [] Other:      Time In:1630            Time Out: 2269    Electronically signed by:  Florencia Herrera PT

## 2019-08-26 ENCOUNTER — HOSPITAL ENCOUNTER (OUTPATIENT)
Dept: PHYSICAL THERAPY | Age: 54
Setting detail: THERAPIES SERIES
Discharge: HOME | End: 2019-08-26
Payer: COMMERCIAL

## 2019-08-26 PROCEDURE — 97016 VASOPNEUMATIC DEVICE THERAPY: CPT

## 2019-08-26 PROCEDURE — 97110 THERAPEUTIC EXERCISES: CPT

## 2019-08-26 NOTE — FLOWSHEET NOTE
[x] [facility]) [facility] &  Therapy  [address]  [phone]  F: [phone] [] [address]  [place] [address]   [address]  P: [phone]  F: [phone] [] [place]  [address]  P: [phone]  F: [phone] [] [address]  [facility]   [address]  [place]: [phone]  F: [phone]     Physical Therapy Daily Treatment Note    Date:  2019  Patient Name:  Venus Holder    :  1965  MRN: [id number]  Physician: Nereyda Reed DO         Insurance: [facility] 12 visits 19- 19  Medical Diagnosis: Sprain of right knee S83.8X1D, Unspecified injury of other muscles and tendons of posterior muscle group at lower leg level, right leg, S86.101D                     Rehab Codes: M25.661, M25.561  Onset Date: 19                               Next 's appt:   Visit# / total visits:   Cancels/No Shows: 0/0    Subjective:    Pain:  [x] Yes  [] No Location: Right knee   Pain Rating: (0-10 scale) 2/10  Pain altered Tx:  [] No  [] Yes  Action:  Comments:  States any soreness he was having with his knee cap has improved. Discussed sleeping positions and that he tosses and turns a lot.       Objective:  Modalities: Vasopneumatic cryotherapy device (GameReady) to Right knee at medium pressure and 34 degrees x 15 min  Precautions:   Exercises:  Exercise Reps/ Time Weight/ Level Comments   SCIFIT 5 min L3          Standing      Gastroc stretch 3x30\"     SL calf raises 20x      Step ups 20x 12 inch    SLS with 4 way  15x     4 way hip 15x Blue    Heel taps  15 x 6 inch    Leg press 15 x 40 lb    TG SL squats 15 x L 30          Seated       HS  stretch 3x30\"  B LE         Supine       SLR 20x 2 lb    SLR with ER 20 x 2 lb          Prone      HS curl 20x 2 lb    Quad stretch

## 2019-08-28 ENCOUNTER — HOSPITAL ENCOUNTER (OUTPATIENT)
Dept: PHYSICAL THERAPY | Age: 54
Setting detail: THERAPIES SERIES
Discharge: HOME | End: 2019-08-28
Payer: COMMERCIAL

## 2019-09-03 ENCOUNTER — HOSPITAL ENCOUNTER (OUTPATIENT)
Dept: PHYSICAL THERAPY | Age: 54
Setting detail: THERAPIES SERIES
Discharge: HOME | End: 2019-09-03
Payer: COMMERCIAL

## 2019-09-03 PROCEDURE — 97110 THERAPEUTIC EXERCISES: CPT

## 2019-09-03 PROCEDURE — 97016 VASOPNEUMATIC DEVICE THERAPY: CPT

## 2019-09-05 ENCOUNTER — OFFICE VISIT (OUTPATIENT)
Dept: ORTHOPEDIC SURGERY | Age: 54
End: 2019-09-05
Payer: COMMERCIAL

## 2019-09-05 VITALS — BODY MASS INDEX: 29.16 KG/M2 | HEIGHT: 73 IN | WEIGHT: 220.02 LBS

## 2019-09-05 DIAGNOSIS — S83.91XA SPRAIN OF RIGHT KNEE, UNSPECIFIED LIGAMENT, INITIAL ENCOUNTER: Primary | ICD-10-CM

## 2019-09-05 PROCEDURE — 99213 OFFICE O/P EST LOW 20 MIN: CPT | Performed by: ORTHOPAEDIC SURGERY

## 2019-09-05 ASSESSMENT — ENCOUNTER SYMPTOMS
CONSTIPATION: 0
DIARRHEA: 0
NAUSEA: 0
COUGH: 0

## 2019-09-05 NOTE — PROGRESS NOTES
[facility]  [facility] ORTHO SPECIALISTS  [address] [facility] [address]  Dept: [phone]  Dept Fax: [phone]        Ambulatory Follow Up      Subjective:   Agueda Dutton is a 62y.o. year old male who presents to our office today for routine followup regarding his   1. Sprain of right knee, unspecified ligament, subsequent encounter    . Chief Complaint   Patient presents with    Knee Pain     right DOI:6/18/19       HPI  Agueda Dutton  is a 62 y.o.  male who presents today in follow for right knee pain due to a work related injury that occurred on 6/18/19. The patient was last seen on 8/5/2019 and underwent treatment in the form of corticosteroid injection, brace wearing and PT. The patient notes 80% improvement with the previous treatment. Patient c/o of some stiffness in the morning and increase pain and when bending the knee beyond 90 degrees. Review of Systems   Constitutional: Negative for chills and fever. Respiratory: Negative for cough. Gastrointestinal: Negative for constipation, diarrhea and nausea. Musculoskeletal: Negative for arthralgias, gait problem, joint swelling and myalgias. Neurological: Negative for dizziness, weakness and numbness. I have reviewed the CC, HPI, ROS, PMH, FHX, Social History, and if not present in this note, I have reviewed in the patient's chart. I agree with the documentation provided by other staff and have reviewed their documentation prior to providing my signature indicating agreement. Objective :   Ht 6' 0.99\" (1.854 m)   Wt 220 lb 0.3 oz (99.8 kg)   BMI 29.03 kg/m²  Body mass index is 29.03 kg/m². General: Agueda Dutton is a 62 y.o. male who is alert and oriented and sitting comfortably in our office. Ortho Exam  MS:  Nearly full ROM of the right knee. No limp with ambulation. Sequela of osgood-schlatter's of right knee is noted. No gross instability of the right knee is appreciated. Motor, sensory, vascular examination to the right lower extremity is grossly intact without focal deficits. Neuro: alert and oriented to person and place. Eyes: Extra-ocular muscles intact  Mouth: Oral mucosa moist. No perioral lesions  Pulm: Respirations unlabored and regular. Symmetric chest excursion without outward deformity is noted. Skin: warm, well perfused  Psych:   Patient has good fund of knowledge and displays understanging of exam, diagnosis, and plan. Assessment:      1. Sprain of right knee, unspecified ligament, subsequent encounter       Plan:      Patient is doing well. He is complete out the formal therapy as scheduled. Discussed that if he would like to repeat the injection for the right knee, we would like to wait 4 months in between the injection. At this point he may follow up as needed. Instructed patient to call with any concerns. Follow up:Return if symptoms worsen or fail to improve. No orders of the defined types were placed in this encounter. No orders of the defined types were placed in this encounter. Jose Martin MA am scribing for and in the presence of Dr. Taye Major  9/5/2019 4:12 PM    I have reviewed and made changes accordingly to the work scribed by Jonnie Alexander MA. The documentation accurately reflects work and decisions made by me. I have also reviewed documentation completed by clinical staff.     Taye Major DO, [facility]  9/8/2019 4:33 PM    This note is created with the assistance of a speech recognition program.  While intending to generate a document that actually reflects the content of the visit, the document can still have some errors including those of syntax and sound a like substitutions which may escape proof reading.  In such instances, actual meaning can be extrapolated by contextual diversion      Electronically signed by Kaye Hernandez DO, FAOAO on 9/8/2019 at 4:32 PM

## 2019-09-12 ENCOUNTER — HOSPITAL ENCOUNTER (OUTPATIENT)
Dept: PHYSICAL THERAPY | Age: 54
Setting detail: THERAPIES SERIES
Discharge: HOME | End: 2019-09-12
Payer: COMMERCIAL

## 2020-02-03 ENCOUNTER — HOSPITAL ENCOUNTER (OUTPATIENT)
Age: 55
Discharge: HOME | End: 2020-02-03
Payer: COMMERCIAL

## 2020-02-03 ENCOUNTER — HOSPITAL ENCOUNTER (OUTPATIENT)
Dept: GENERAL RADIOLOGY | Age: 55
Discharge: HOME | End: 2020-02-05
Payer: COMMERCIAL

## 2020-02-03 PROCEDURE — 73030 X-RAY EXAM OF SHOULDER: CPT

## 2020-02-18 ENCOUNTER — HOSPITAL ENCOUNTER (OUTPATIENT)
Dept: PHYSICAL THERAPY | Age: 55
Setting detail: THERAPIES SERIES
Discharge: HOME | End: 2020-02-18
Payer: COMMERCIAL

## 2020-02-18 PROCEDURE — 97110 THERAPEUTIC EXERCISES: CPT

## 2020-02-18 PROCEDURE — 97162 PT EVAL MOD COMPLEX 30 MIN: CPT

## 2020-02-18 NOTE — CONSULTS
[x] [facility]        Outpatient Physical                Therapy       [address]       Phone: [phone]       Fax: [phone] [] [facility]       Promotion at [address] [place] [address]       Phone: [phone]       Fax: [phone] [] [facility]      for Health Prom[facility]     [address] [facility]      Phone: [phone]      Fax:  [phone]     Physical Therapy Upper Extremity Evaluation    Date:  2020  Patient: Evelyn Ramos  : 1965  MRN: [id number]  Physician: Jamar Dowd MD   Insurance: [facility] 3x/3 wks until 3/14/20  Medical Diagnosis: Unspecified sprain of right shoulder joint; strain of unspecified muscle, fascia, and tendon at shoulder and upper arm level, right arm  Rehab Codes: M25.511, M25.611, R29.3, R53.1  Onset Date: 20   Next 's appt: 20    Subjective:   CC: Pt arrives for physical therapy evaluation of R shoulder pain, originating on 20 d/t using jackhammer while at work to reinjure shoulder. Limited with any lifting or reaching of shoulder d/t pain, UE dressing, lifting lighter weight, pouring into a glass, sleeping on R side. Pt is right handed. Notes he's been attempting heat/ice for pain with minimal improvement, constant pain still. Denies N/T in the arm. Unable to self assess for mechanical symptoms d/t limited movement. HPI: \"3 tears\" in R shoulder deemed in 2016; given a cortizone shot that was helpful for awhile.     PMHx: [] Unremarkable [] Diabetes [] HTN  [] Pacemaker   [] MI/Heart Problems [] Cancer [] Arthritis [] Other:              [x] Refer to full medical chart  In EPIC   Tests: [x] X-Ray: 2/3/20\"   FINDINGS:   There is no acute osseous abnormality.  The glenohumeral joint spaces are   maintained.  There is degenerative change of the right AC joint.  The   surrounding soft tissues are unremarkable.  The visualized right lung is   without acute Unable to complete most special testing d/t high level of muscle guarding and limited ROM      FUNCTION Normal Difficult Unable   Overhead reach [] [] [x]   Underarm reach  [] [x] []   Groom/Dress [] [x] []   Bra/Shirt tuck [] [x] []   Lift/Carry [] [x] []    [] [] []     Comments:    Assessment: Erinn Barclay is a 58yo male who presents today with significant R shoulder pain, muscle guarding, and limited AROM secondary to injury at work while using cynthia-hammer at work. Possible pec/biceps longhead strain, difficult to assess severity d/t high level of muscle guarding limiting PROM assessment. Pt will benefit from skilled physical therapy treatment to address these deficits to promote return to normal function and return to work. Problems:    [x] ? Pain:  [x] ? ROM:  [x] ? Strength:  [x] ? Function:  [] Other:    STG: (to be met in 9 treatments)  1. ? Pain: No greater than 3/10 pain reported   2. ? ROM: Symmetrical AROM in all planes of R shoulder with minimal pain at end range, especially horizontal add/abduction, abduction, and flexion. 3. ? Strength: 5/5 strength in R shoulder abd/flex/IR with only minimal pain reported with testing. 4. ? Function:   a. Pt will report improved ability to use RUE with UE dressing without increase in pain. b. Pt will be able to lift 20 lbs from waist height to overhead without difficulty and minimal increase in pain. 5. Independent with Home Exercise Programs                     Patient goals: \"to return to work with no problems\"      Rehab Potential:  [x] Good  [] Fair  [] Poor   Suggested Professional Referral:  [x] No  [] Yes:  Barriers to Goal Achievement[de-identified]  [] No  [x] Yes: High level of muscle guarding  Domestic Concerns:  [x] No  [] Yes:    Pt. Education:  [x] Plans/Goals, Risks/Benefits discussed  [x] Home exercise program  Method of Education: [x] Verbal  [x] Demo  [x] Written  Comprehension of Education:  [x] Verbalizes understanding.   [x] Demonstrates understanding. [] Needs Review. [] Demonstrates/verbalizes understanding of HEP/Ed previously given. Treatment Plan:  [x] Therapeutic Exercise   18672  [] Iontophoresis: 4 mg/mL Dexamethasone Sodium Phosphate  mAmin  65405   [x] Therapeutic Activity  28790 [x] Vasopneumatic cold with compression  15785    [x] Gait Training   57444 [] Ultrasound   37863   [x] Neuromuscular Re-education  93821 [] Electrical Stimulation Unattended  40167   [x] Manual Therapy  41859 [x] Electrical Stimulation Attended  21477   [x] Instruction in HEP  [] Dry Needling   [] Aquatic Therapy   21407 [x] Cold/hotpack    [] Massage   88071      [] Lumbar/Cervical Traction  30771     []  Medication allergies reviewed for use of    Dexamethasone Sodium Phosphate 4mg/ml     with iontophoresis treatments. Pt is not allergic. Frequency: 3 x/week for 9 visits until 3/14/20    Todays Treatment:  Modalities:   Precautions:  Exercises:  Exercise Reps/ Time Weight/ Level Comments   Supine      Cane chest press 2x10     Cane shoulder flexion 2x10                 Seated      Bilateral ER w/ scap retract 15x     Upper trap stretch 2x30\"     Other: Pt education provided re: pt impairments, pathology, PT POC to address these deficits - billed with therex    Specific Instructions for next treatment: pulleys/UBE if tolerated for warm up; cane AAROM with muscle guarding, scapular strengthening, light pec stretching; begin shoulder AROM to tolerance. Shoulder/pec isometrics.         Evaluation Complexity:  History (Personal factors, comorbidities) [] 0 [] 1-2 [x] 3+   Exam (limitations, restrictions) [] 1-2 [] 3 [x] 4+   Clinical presentation (progression) [] Stable [x] Evolving  [] Unstable   Decision Making [] Low [x] Moderate [] High    [] Low Complexity [x] Moderate Complexity [] High Complexity       Treatment Charges: Mins Units   [x] Evaluation       []  Low       [x]  Moderate       []  High 21 1   [x]  Modalities: HP 10 --   [x]  Ther

## 2020-02-20 ENCOUNTER — HOSPITAL ENCOUNTER (OUTPATIENT)
Dept: PHYSICAL THERAPY | Age: 55
Setting detail: THERAPIES SERIES
Discharge: HOME | End: 2020-02-20
Payer: COMMERCIAL

## 2020-02-20 PROCEDURE — 97110 THERAPEUTIC EXERCISES: CPT

## 2020-02-20 NOTE — FLOWSHEET NOTE
[] [facility]) [facility] &  Therapy  [address]  [phone]  F: [phone] [] [address]  [place] [address]  P: [phone]  F: [phone] [] [address]  Therapy  [address]  P: [phone]  F: [phone] [] [address]  [facility]   [address]   [place]: [phone]  F: [phone]      Physical Therapy Daily Treatment Note    Date:  2020  Patient Name:  Millie Cuellar    :  1965  MRN: [id number]  Physician: Sharona Gautam MD                             Insurance: [facility] ([facility] [place] 3x/3 wks until 3/14/20  Medical Diagnosis: Unspecified sprain of right shoulder joint; strain of unspecified muscle, fascia, and tendon at shoulder and upper arm level, right arm  Rehab Codes: M25.511, M25.611, R29.3, R53.1  Onset Date: 20               Next 's appt: 20  Visit# / total visits: 2/  Cancels/No Shows: 0/0    Subjective:    Pain:  [x] Yes  [] No  Location: R shoulder Pain Rating: (0-10 scale) 8/10  Pain altered Tx:  [] No  [] Yes  Action:  Comments: Patient reports pain in R shoulder. Pain increases with movement, sleeping and reaching out. Both hot and cold packs work at home for pain.       Objective:  Modalities: HP in supine to R shoulder with small pad  Precautions:  Exercises:  Exercise Reps/ Time Weight/ Level Comments   UBE 2/2           Supine      Cane chest press 2x10 1 lb cane    Cane shoulder flexion 2x10 1 lb cane    Cane ER 10x 1 lb cane    Cane abd 10x 1 lb cane Very painful         Seated       Pablo ER w/ scap retrac 15x     Upper trap stretch 2x30\"     Scapular depression 10x     Cervical rotation 5x10\"     Levator stretch 5x10\"                       Other:      Treatment Charges: Mins Units   [x]  Modalities   HP 10 0   [x]  Ther Exercise 26 2 []  Manual Therapy     []  Ther Activities     []  Aquatics     []  Vasocompression     []  Other     Total Treatment time 26 2       Assessment: [x] Progressing toward goals. Patient was very apprehensive and limited himself with ROM. Performed supine cane exercises with additional ER and abd with poor tolerance. Patient noted most painful with AA abd. Added scapular and trap ex to increase shoulder stability and cervical ROM to decrease patient guarding. Ended with HP applied to R shoulder. [] No change. [] Other:      Problems:    [x]? ? Pain:  [x]? ? ROM:  [x]? ? Strength:  [x]? ? Function:  []? Other:     STG: (to be met in 9 treatments)  1. ? Pain: No greater than 3/10 pain reported   2. ? ROM: Symmetrical AROM in all planes of R shoulder with minimal pain at end range, especially horizontal add/abduction, abduction, and flexion. 3. ? Strength: 5/5 strength in R shoulder abd/flex/IR with only minimal pain reported with testing. 4. ? Function:   a. Pt will report improved ability to use RUE with UE dressing without increase in pain. b. Pt will be able to lift 20 lbs from waist height to overhead without difficulty and minimal increase in pain. 5. Independent with Home Exercise Programs                  Patient goals: \"to return to work with no problems\"      Rehab Potential:  [x]? Good  []? Fair  []? Poor    Suggested Professional Referral:  [x]? No  []? Yes:  Barriers to Goal Achievement[de-identified]  []? No  [x]? Yes: High level of muscle guarding  Domestic Concerns:  [x]? No  []? Yes:    Pt. Education:  [x] Yes  [] No  [x] Reviewed Prior HEP/Ed  Method of Education: [x] Verbal  [x] Demo  [] Written  Comprehension of Education:  [x] Verbalizes understanding. [x] Demonstrates understanding. [] Needs review. [x] Demonstrates/verbalizes HEP/Ed previously given. Plan: [x] Continue for 8 visits toward short and long term goals.   [x] Specific Instructions for subsequent treatments: pulleys/UBE if tolerated for warm up; cane AAROM with muscle guarding, scapular strengthening, light pec stretching; begin shoulder AROM to tolerance. Shoulder/pec isometrics.       Time In: 1710            Time Out: 1750    Electronically signed by:  Lonnie Rachel PTA

## 2020-02-25 ENCOUNTER — HOSPITAL ENCOUNTER (OUTPATIENT)
Dept: PHYSICAL THERAPY | Age: 55
Setting detail: THERAPIES SERIES
Discharge: HOME | End: 2020-02-25
Payer: COMMERCIAL

## 2020-02-25 PROCEDURE — 97016 VASOPNEUMATIC DEVICE THERAPY: CPT

## 2020-02-25 PROCEDURE — 97110 THERAPEUTIC EXERCISES: CPT

## 2020-02-27 ENCOUNTER — HOSPITAL ENCOUNTER (OUTPATIENT)
Dept: PHYSICAL THERAPY | Age: 55
Setting detail: THERAPIES SERIES
Discharge: HOME | End: 2020-02-27
Payer: COMMERCIAL

## 2020-02-27 PROCEDURE — 97110 THERAPEUTIC EXERCISES: CPT

## 2020-02-27 NOTE — FLOWSHEET NOTE
[] [facility] &  Therapy  [address]  [phone]  F: [phone] [] [address]  [place] [address]   [address]  P: [phone]  F: [phone] [] [address]  Therapy  [address] [place] [address]  P: [phone]  F: [phone] [] [address]  [facility]   [address]   [place]: [phone]  F: [phone]      Physical Therapy Daily Treatment Note    Date:  2020  Patient Name:  Nilo Matthew    :  1965  MRN: [id number]  Physician: Wilber Price MD                             Insurance: [id number]): 3x/3 wks until 3/14/20  Medical Diagnosis: Unspecified sprain of right shoulder joint; strain of unspecified muscle, fascia, and tendon at shoulder and upper arm level, right arm  Rehab Codes: M25.511, M25.611, R29.3, R53.1  Onset Date: 20               Next 's appt: 20  Visit# / total visits: 4/9  Cancels/No Shows: 0/0    Subjective:    Pain:  [x] Yes  [] No  Location: R shoulder  Pain Rating: (0-10 scale) /10  Pain altered Tx:  [] No  [] Yes  Action:  Comments: Patient reports no pain with no activity. Pain increases 8/10 with flexion over 90 degrees, reaching, laying on shoulder or reaching to side or behind.     Objective:  Modalities: HP in supine to R shoulder with small pad  Precautions:  Exercises:  Exercise Reps/ Time Weight/ Level Comments   UBE 2/2     Pulleys 1 min           Supine      Cane chest press 2x10 1 lb cane    Cane shoulder flexion 2x10 1 lb cane Hands close together   Cane ER 10x 1 lb cane    Cane abd 10x 1 lb cane Very painful   Cane IR 10x 1 lb cane Very painful         Seated       Pablo ER w/ scap retrac 15x     Upper trap stretch 2x30\"     Scapular depression 10x     Cervical rotation 5x10\"     Levator stretch 5x10\"     Bicep curls 15x 2 lb DB

## 2020-02-28 ENCOUNTER — HOSPITAL ENCOUNTER (OUTPATIENT)
Dept: PHYSICAL THERAPY | Age: 55
Setting detail: THERAPIES SERIES
Discharge: HOME | End: 2020-02-28
Payer: COMMERCIAL

## 2020-02-28 PROCEDURE — 97110 THERAPEUTIC EXERCISES: CPT

## 2020-02-28 NOTE — FLOWSHEET NOTE
15 3 weights Supination/ pronation         Thera bands   Added 2.27   -Ext 10x Light TB    -Rows 10x Light TB    -IR 10x Light TB Elbow at side, bent to 90         Prone:      Rows 10x A    Ext 10x A          Ball on wall- Flexion 10x           Seated stool stretch-Flexion 10x 10\"          Codman's with distraction using DB 20xea  CW, CCW   Other:      Treatment Charges: Mins Units    []  Modalities   HP      [x]  Ther Exercise 37 2    []  Manual Therapy      []  Ther Activities      []  Aquatics      [x]  Vasocompression      []  Other      Total Treatment time 37 2        Assessment: [x] Progressing toward goals. Added prone scapular PRE's, ball on wall into flexion, self flexion stretch this date. Pt has pain with most exercises as indicated by facial grimacing and constantly rubbing his anterior shoulder. Pt instructed to stay below the point of range that causes the sharp, painful pinch during exercises. Ended with Codman's using a dumbbell to provide joint distraction in attempt to offer the pt some pain relief. [] No change. [x] Other: Pt unable to come next Tuesday as his wife is having a procedure. Pt will come next Thursday and Friday and then two the following week. Problems:    [x]? ? Pain:  [x]? ? ROM:  [x]? ? Strength:  [x]? ? Function:  []? Other:     STG: (to be met in 9 treatments)  1. ? Pain: No greater than 3/10 pain reported   2. ? ROM: Symmetrical AROM in all planes of R shoulder with minimal pain at end range, especially horizontal add/abduction, abduction, and flexion. 3. ? Strength: 5/5 strength in R shoulder abd/flex/IR with only minimal pain reported with testing. 4. ? Function:   a. Pt will report improved ability to use RUE with UE dressing without increase in pain. b. Pt will be able to lift 20 lbs from waist height to overhead without difficulty and minimal increase in pain.   5. Independent with Home Exercise Programs                  Patient goals: \"to

## 2020-03-03 ENCOUNTER — APPOINTMENT (OUTPATIENT)
Dept: PHYSICAL THERAPY | Age: 55
End: 2020-03-03
Payer: COMMERCIAL

## 2020-03-05 ENCOUNTER — HOSPITAL ENCOUNTER (OUTPATIENT)
Dept: PHYSICAL THERAPY | Age: 55
Setting detail: THERAPIES SERIES
Discharge: HOME | End: 2020-03-05
Payer: COMMERCIAL

## 2020-03-05 PROCEDURE — 97016 VASOPNEUMATIC DEVICE THERAPY: CPT

## 2020-03-05 PROCEDURE — 97110 THERAPEUTIC EXERCISES: CPT

## 2020-03-05 NOTE — FLOWSHEET NOTE
[x] [facility] &  Therapy  [address]  [phone]  F: [phone] [] [address]  [place] [address]   [address]  P: [phone]  F: [phone] [] [address]  Therapy  [address] [facility]  P: [phone]  F: [phone] [] [address]  [facility]   [address]   [place]: [phone]  F: [phone]      Physical Therapy Daily Treatment Note    Date:  3/5/2020  Patient Name:  Siri Nelson    :  1965  MRN: [id number]  Physician: Kieran Ahn MD                             Insurance: [facility] ([facility] [place]): 3x/3 wks until 3/14/20  Medical Diagnosis: Unspecified sprain of right shoulder joint; strain of unspecified muscle, fascia, and tendon at shoulder and upper arm level, right arm  Rehab Codes: M25.511, M25.611, R29.3, R53.1  Onset Date: 20               Next 's appt: 20     Visit# / total visits: 6               Cancels/No Shows: 0/0    Subjective:    Pain:  [x] Yes  [] No  Location: R shoulder  Pain Rating: (0-10 scale)   5-6/10  Pain altered Tx:  [] No  [] Yes  Action:  Comments: Patient reports his R shoulder is still very sore and painful     Objective:  Modalities: HP in supine to R shoulder with small pad  Precautions:  Exercises:  Exercise Reps/ Time Weight/ Level Comments   UBE 2/2 L2.0    Pulleys 1 min           Supine      Cane chest press 2x10 1 lb cane    Cane shoulder flexion 2x10 1 lb cane Hands close together   [employer] ER 10x 1 lb cane    Cane abd 10x 1 lb cane Very painful   Cane IR 10x 1 lb cane Very painful         Seated       Pablo ER w/ scap retrac 15x     Upper trap stretch 2x30\"     Scapular depression 10x     Cervical rotation 5x10\"     Levator stretch 5x10\"     Bicep curls 15x 2 lb DB    Hammer 15 3 weights Supination/ pronation         Thera bands   Added pain.  5. Independent with Home Exercise Programs                  Patient goals: \"to return to work with no problems\"      Rehab Potential:  [x]? Good  []? Fair  []? Poor    Suggested Professional Referral:  [x]? No  []? Yes:  Barriers to Goal Achievement[de-identified]  []? No  [x]? Yes: High level of muscle guarding  Domestic Concerns:  [x]? No  []? Yes:    Pt. Education:  [x] Yes  [] No  [x] Reviewed Prior HEP/Ed  Method of Education: [x] Verbal  [x] Demo  [] Written  Comprehension of Education:  [x] Verbalizes understanding. [x] Demonstrates understanding. [] Needs review. [x] Demonstrates/verbalizes HEP/Ed previously given. Plan: [x] Continue for 6 visits toward short and long term goals. [x] Specific Instructions for subsequent treatments: pulleys/UBE if tolerated for warm up; cane AAROM with muscle guarding, scapular strengthening, light pec stretching; begin shoulder AROM to tolerance. Shoulder/pec isometrics.       Time In: 1545            Time Out: 1650    Electronically signed by:  Ana Farfan PTA

## 2020-03-06 ENCOUNTER — HOSPITAL ENCOUNTER (OUTPATIENT)
Dept: PHYSICAL THERAPY | Age: 55
Setting detail: THERAPIES SERIES
Discharge: HOME | End: 2020-03-06
Payer: COMMERCIAL

## 2020-03-06 PROCEDURE — 97110 THERAPEUTIC EXERCISES: CPT

## 2020-03-06 PROCEDURE — 97016 VASOPNEUMATIC DEVICE THERAPY: CPT

## 2020-03-06 NOTE — FLOWSHEET NOTE
[x] [facility] &  Therapy  [address]  [phone]  F: [phone] [] [address]  [place] [address]   [address]  P: [phone]  F: [phone] [] [address]  Therapy  [address]  P: [phone]  F: [phone] [] [address]  [facility]   [address]   [place]: [phone]  F: [phone]      Physical Therapy Daily Treatment Note    Date:  3/6/2020  Patient Name:  Fabien Lo    :  1965  MRN: [id number]  Physician: Clarke Mccormack MD                             Insurance: [facility] ([facility]): 3x/3 wks until 3/14/20  Medical Diagnosis: Unspecified sprain of right shoulder joint; strain of unspecified muscle, fascia, and tendon at shoulder and upper arm level, right arm  Rehab Codes: M25.511, M25.611, R29.3, R53.1  Onset Date: 20               Next 's appt: 20     Visit# / total visits: 7/9               Cancels/No Shows: 0/0    Subjective:    Pain:  [x] Yes  [] No  Location: R shoulder  Pain Rating: (0-10 scale)   4/10  Pain altered Tx:  [x] No  [] Yes  Action:  Comments: Patient arrives stating not as sore as yesterday with PT but still has lots of pain with movement.       Objective:  Modalities:  Vaso compression to R shoulder 36 degrees   Precautions:  Exercises:  Exercise Reps/ Time Weight/ Level Comments   UBE 2/2 L2.0    Pulleys 1 min     Finger ladder 5x  Added 3.6                     Supine      Cane chest press 2x10 1 lb cane    Cane shoulder flexion 2x10 1 lb cane Hands close together   Cane ER 10x 1 lb cane    Cane abd 10x 1 lb cane Very painful   Cane IR 10x 1 lb cane Very painful         Seated       Pablo ER w/ scap retrac 15x     Upper trap stretch 2x30\"     Scapular depression 10x     Cervical rotation 5x10\"     Levator stretch 5x10\"     Bicep curls 15x2 3 lb DB    Hammer 15x 3 weights Supination/ pronation         Thera bands   Added 2.27   -Ext 15x Light TB    -Triceps 15x Light TB    -Rows 15x Light TB    -IR 15x Light TB Elbow at side, bent to 90         Prone:      Rows 15x A    Ext 15x A          Ball on wall- Flexion/ext 10x     Wax on/ wax off 10x ea     Seated stool stretch-Flexion 10x 10\"          Codman's with distraction using DB 20xea 2 lb ankle weight on wrist CW, CCW   Other:      Treatment Charges: Mins Units    []  Modalities   HP      [x]  Ther Exercise 42 3    []  Manual Therapy      []  Ther Activities      []  Aquatics      [x]  Vasocompression 15 1    []  Other      Total Treatment time 57 4        Assessment: [] Progressing toward goals. [x] No change. Added finger ladder and wax on/wax off on wall to improve ROM with fair tolerance. Abd and IR are still very painful with AROM and PROM at end of range. Would have liked to seen further advancement in ROM after 7 visits      [x] Other: ROM and MMT assessed 3.6.20 along with STG    Flexion  114 degrees  5-/5  Abd  90   degrees  4+/5     IR  30   degrees  4-/5     ER  55   Degrees  5-/5      Problems:    [x]? ? Pain:  [x]? ? ROM:  [x]? ? Strength:  [x]? ? Function:  []? Other:     STG: (to be met in 9 treatments)  1. ? Pain: No greater than 3/10 pain reported -NOT MET  2. ? ROM: Symmetrical AROM in all planes of R shoulder with minimal pain at end range, especially horizontal add/abduction, abduction, and flexion. ROM has improved but experiences most pain with IR and abd -Continues to experience high pain levels with IR and abd.   3. ? Strength: 5/5 strength in R shoulder abd/flex/IR with only minimal pain reported with testing. - Improvement noted with flexion and ER but deficts in IR and abd  4. ? Function:   a. Pt will report improved ability to use RUE with UE dressing without increase in pain.  -Patient expresses pain donning on coat  b.  Pt will be able to lift 20 lbs from waist height to overhead without difficulty and minimal increase in pain. -Patient is unable to complete light thera bands without increased pain  5. Independent with Home Exercise Programs  -Patient states he is compliant with HEP                  Patient goals: \"to return to work with no problems\"      Rehab Potential:  [x]? Good  []? Fair  []? Poor    Suggested Professional Referral:  [x]? No  []? Yes:  Barriers to Goal Achievement[de-identified]  []? No  [x]? Yes: High level of muscle guarding  Domestic Concerns:  [x]? No  []? Yes:    Pt. Education:  [x] Yes  [] No  [x] Reviewed Prior HEP/Ed  Method of Education: [x] Verbal  [x] Demo  [] Written  Comprehension of Education:  [x] Verbalizes understanding. [x] Demonstrates understanding. [] Needs review. [x] Demonstrates/verbalizes HEP/Ed previously given. Plan: [x] Continue for 6 visits toward short and long term goals. [x] Specific Instructions for subsequent treatments: pulleys/UBE if tolerated for warm up; cane AAROM with muscle guarding, scapular strengthening, light pec stretching; begin shoulder AROM to tolerance. Shoulder/pec isometrics.       Time In: 1400            Time Out: 1510    Electronically signed by:  Terell Ott PTA

## 2020-03-10 ENCOUNTER — HOSPITAL ENCOUNTER (OUTPATIENT)
Dept: PHYSICAL THERAPY | Age: 55
Setting detail: THERAPIES SERIES
Discharge: HOME | End: 2020-03-10
Payer: COMMERCIAL

## 2020-03-10 PROCEDURE — 97110 THERAPEUTIC EXERCISES: CPT

## 2020-03-10 NOTE — FLOWSHEET NOTE
[x] [facility]) [facility] &  Therapy  [address]  [phone]  F: [phone] [] [address]  [place] [address]   [address]  P: [phone]  F: [phone] [] [address]  Therapy  [address] [place] [address]  P: [phone]  F: [phone] [] [address]  [facility]   [address]   [place]: [phone]  F: [phone]      Physical Therapy Daily Treatment Note    Date:  3/10/2020  Patient Name:  Mann Gallego    :  1965  MRN: [id number]  Physician: Luis Austin MD                             Insurance: [id number] [place] [place] 3x/3 wks until 3/14/20  Medical Diagnosis: Unspecified sprain of right shoulder joint; strain of unspecified muscle, fascia, and tendon at shoulder and upper arm level, right arm  Rehab Codes: M25.511, M25.611, R29.3, R53.1  Onset Date: 20               Next 's appt: 20 [facility], 3/11/2020 Dr. Rukhsana Telles     Visit# / total visits: 8/9               Cancels/No Shows: 0/0    Subjective:    Pain:  [x] Yes  [] No  Location: R shoulder  Pain Rating: (0-10 scale)   4-5/10  Pain altered Tx:  [x] No  [] Yes  Action:  Comments: States pain is 4-5/10. Sees ortho doctor tomorrow. Objective:  Modalities:  Vaso compression to R shoulder 36 degrees--patient declined this date.   Precautions:  Exercises:  Exercise Reps/ Time Weight/ Level Comments   UBE 2/2 L2.0    Pulleys 1 min     Finger ladder 5x  Flexion reviewed                     Supine      Cane chest press 2x10 1 lb cane    Cane shoulder flexion 2x10 1 lb cane Hands close together   Cane ER 10x 1 lb cane    Cane abd 10x 1 lb cane Very painful   Cane IR 10x 1 lb cane Very painful         Seated       Pablo ER w/ scap retrac 15x     Upper trap stretch 2x30\"     Scapular depression 10x     Cervical rotation 5x10\"     Levator stretch 5x10\"

## 2020-03-13 ENCOUNTER — HOSPITAL ENCOUNTER (OUTPATIENT)
Dept: PHYSICAL THERAPY | Age: 55
Setting detail: THERAPIES SERIES
Discharge: HOME | End: 2020-03-13
Payer: COMMERCIAL

## 2020-03-19 ENCOUNTER — HOSPITAL ENCOUNTER (EMERGENCY)
Age: 55
Discharge: HOME | End: 2020-03-19
Attending: EMERGENCY MEDICINE
Payer: COMMERCIAL

## 2020-03-19 ENCOUNTER — APPOINTMENT (OUTPATIENT)
Dept: GENERAL RADIOLOGY | Age: 55
End: 2020-03-19
Payer: COMMERCIAL

## 2020-03-19 VITALS
HEIGHT: 72 IN | HEART RATE: 76 BPM | RESPIRATION RATE: 15 BRPM | SYSTOLIC BLOOD PRESSURE: 126 MMHG | OXYGEN SATURATION: 98 % | TEMPERATURE: 99.2 F | DIASTOLIC BLOOD PRESSURE: 79 MMHG | BODY MASS INDEX: 30.48 KG/M2 | WEIGHT: 225 LBS

## 2020-03-19 LAB
ABSOLUTE EOS #: <0.03 K/UL (ref 0–0.44)
ABSOLUTE IMMATURE GRANULOCYTE: <0.03 K/UL (ref 0–0.3)
ABSOLUTE LYMPH #: 1.16 K/UL (ref 1.1–3.7)
ABSOLUTE MONO #: 0.67 K/UL (ref 0.1–1.2)
ALBUMIN SERPL-MCNC: 4 G/DL (ref 3.5–5.2)
ALBUMIN/GLOBULIN RATIO: 1.3 (ref 1–2.5)
ALP BLD-CCNC: 43 U/L (ref 40–129)
ALT SERPL-CCNC: 35 U/L (ref 5–41)
ANION GAP SERPL CALCULATED.3IONS-SCNC: 14 MMOL/L (ref 9–17)
AST SERPL-CCNC: 32 U/L
BASOPHILS # BLD: 1 % (ref 0–2)
BASOPHILS ABSOLUTE: <0.03 K/UL (ref 0–0.2)
BILIRUB SERPL-MCNC: 0.68 MG/DL (ref 0.3–1.2)
BNP INTERPRETATION: NORMAL
BUN BLDV-MCNC: 16 MG/DL (ref 6–20)
BUN/CREAT BLD: ABNORMAL (ref 9–20)
CALCIUM SERPL-MCNC: 8.8 MG/DL (ref 8.6–10.4)
CHLORIDE BLD-SCNC: 103 MMOL/L (ref 98–107)
CO2: 21 MMOL/L (ref 20–31)
CREAT SERPL-MCNC: 1.15 MG/DL (ref 0.7–1.2)
DIFFERENTIAL TYPE: ABNORMAL
EKG ATRIAL RATE: 83 BPM
EKG P AXIS: 17 DEGREES
EKG P-R INTERVAL: 164 MS
EKG Q-T INTERVAL: 376 MS
EKG QRS DURATION: 80 MS
EKG QTC CALCULATION (BAZETT): 441 MS
EKG R AXIS: 4 DEGREES
EKG T AXIS: 8 DEGREES
EKG VENTRICULAR RATE: 83 BPM
EOSINOPHILS RELATIVE PERCENT: 0 % (ref 1–4)
GFR AFRICAN AMERICAN: >60 ML/MIN
GFR NON-AFRICAN AMERICAN: >60 ML/MIN
GFR SERPL CREATININE-BSD FRML MDRD: ABNORMAL ML/MIN/{1.73_M2}
GFR SERPL CREATININE-BSD FRML MDRD: ABNORMAL ML/MIN/{1.73_M2}
GLUCOSE BLD-MCNC: 121 MG/DL (ref 70–99)
HCT VFR BLD CALC: 48.1 % (ref 40.7–50.3)
HEMOGLOBIN: 15.2 G/DL (ref 13–17)
IMMATURE GRANULOCYTES: 0 %
LYMPHOCYTES # BLD: 37 % (ref 24–43)
MCH RBC QN AUTO: 28.7 PG (ref 25.2–33.5)
MCHC RBC AUTO-ENTMCNC: 31.6 G/DL (ref 28.4–34.8)
MCV RBC AUTO: 90.8 FL (ref 82.6–102.9)
MONOCYTES # BLD: 22 % (ref 3–12)
NRBC AUTOMATED: 0 PER 100 WBC
PDW BLD-RTO: 12.6 % (ref 11.8–14.4)
PLATELET # BLD: 262 K/UL (ref 138–453)
PLATELET ESTIMATE: ABNORMAL
PMV BLD AUTO: 9.2 FL (ref 8.1–13.5)
POTASSIUM SERPL-SCNC: 3.7 MMOL/L (ref 3.7–5.3)
PRO-BNP: <20 PG/ML
RBC # BLD: 5.3 M/UL (ref 4.21–5.77)
RBC # BLD: ABNORMAL 10*6/UL
SEG NEUTROPHILS: 40 % (ref 36–65)
SEGMENTED NEUTROPHILS ABSOLUTE COUNT: 1.26 K/UL (ref 1.5–8.1)
SODIUM BLD-SCNC: 138 MMOL/L (ref 135–144)
TOTAL PROTEIN: 7.2 G/DL (ref 6.4–8.3)
TROPONIN INTERP: NORMAL
TROPONIN INTERP: NORMAL
TROPONIN T: NORMAL NG/ML
TROPONIN T: NORMAL NG/ML
TROPONIN, HIGH SENSITIVITY: 8 NG/L (ref 0–22)
TROPONIN, HIGH SENSITIVITY: 8 NG/L (ref 0–22)
WBC # BLD: 3.1 K/UL (ref 3.5–11.3)
WBC # BLD: ABNORMAL 10*3/UL

## 2020-03-19 PROCEDURE — 93010 ELECTROCARDIOGRAM REPORT: CPT | Performed by: INTERNAL MEDICINE

## 2020-03-19 PROCEDURE — 99284 EMERGENCY DEPT VISIT MOD MDM: CPT

## 2020-03-19 PROCEDURE — 84484 ASSAY OF TROPONIN QUANT: CPT

## 2020-03-19 PROCEDURE — 71046 X-RAY EXAM CHEST 2 VIEWS: CPT

## 2020-03-19 PROCEDURE — 83880 ASSAY OF NATRIURETIC PEPTIDE: CPT

## 2020-03-19 PROCEDURE — 93005 ELECTROCARDIOGRAM TRACING: CPT | Performed by: STUDENT IN AN ORGANIZED HEALTH CARE EDUCATION/TRAINING PROGRAM

## 2020-03-19 PROCEDURE — 96365 THER/PROPH/DIAG IV INF INIT: CPT

## 2020-03-19 PROCEDURE — 80053 COMPREHEN METABOLIC PANEL: CPT

## 2020-03-19 PROCEDURE — 85025 COMPLETE CBC W/AUTO DIFF WBC: CPT

## 2020-03-19 PROCEDURE — 2580000003 HC RX 258: Performed by: STUDENT IN AN ORGANIZED HEALTH CARE EDUCATION/TRAINING PROGRAM

## 2020-03-19 RX ORDER — ONDANSETRON 4 MG/1
4 TABLET, ORALLY DISINTEGRATING ORAL EVERY 8 HOURS PRN
Qty: 6 TABLET | Refills: 0 | Status: SHIPPED | OUTPATIENT
Start: 2020-03-19 | End: 2021-09-20 | Stop reason: ALTCHOICE

## 2020-03-19 RX ORDER — SODIUM CHLORIDE, SODIUM LACTATE, POTASSIUM CHLORIDE, AND CALCIUM CHLORIDE .6; .31; .03; .02 G/100ML; G/100ML; G/100ML; G/100ML
1000 INJECTION, SOLUTION INTRAVENOUS ONCE
Status: COMPLETED | OUTPATIENT
Start: 2020-03-19 | End: 2020-03-19

## 2020-03-19 RX ADMIN — SODIUM CHLORIDE, POTASSIUM CHLORIDE, SODIUM LACTATE AND CALCIUM CHLORIDE 1000 ML: 600; 310; 30; 20 INJECTION, SOLUTION INTRAVENOUS at 06:17

## 2020-03-19 ASSESSMENT — ENCOUNTER SYMPTOMS
DIARRHEA: 0
ABDOMINAL PAIN: 0
COUGH: 1
SHORTNESS OF BREATH: 1
BACK PAIN: 1
VOMITING: 1
PHOTOPHOBIA: 0
SORE THROAT: 0
NAUSEA: 1

## 2020-03-19 ASSESSMENT — PAIN SCALES - GENERAL: PAINLEVEL_OUTOF10: 7

## 2020-03-19 ASSESSMENT — PAIN DESCRIPTION - LOCATION: LOCATION: BACK

## 2020-03-19 NOTE — ED PROVIDER NOTES
[facility]  ED  Emergency Department Encounter  EmergencyMedicine Resident     Pt Name:Dusty Russell  MRN: [id number]  [place] 1965  Date of evaluation: 3/19/20  PCP:  Elliott Curling, MD    [address] [place] [address]       Chief Complaint   Patient presents with    Nausea       HISTORY OF PRESENT ILLNESS  (Location/Symptom, Timing/Onset, Context/Setting, Quality, Duration, Modifying Factors, Severity.)      Laura Gaudalupe is a 60 y.o. male who presents after an episode of lightheadedness. Patient reports he was standing and walking when he developed lightheadedness, which caused him to kneel on the ground. He subsequently developed nausea and one episode of nonbloody, nonbilious emesis; shortness of breath, and diaphoresis. No chest pain. Mild abdominal discomfort. His symptoms improved with rest.  Patient reports he has been feeling generally unwell for the past few days, with headache, chills, mild nonproductive cough. He also reports chronic lower back pain with new radiation to his left buttock, otherwise unchanged; no numbness or tingling, focal weakness, gait disturbance, difficulty urinating, urinary incontinence, saddle anesthesia, or any other symptoms. Patient reports he has had coworkers sick with influenza A. Patient was reportedly positive for orthostatic hypotension on EMS assessment. No history of cardiac pathology. No other significant past medical history according to the patient. No regular medications. No allergies to medications. No illicit drug use, specifically no cocaine use. Occasional alcohol. PAST MEDICAL / SURGICAL / SOCIAL / FAMILY HISTORY      has no past medical history on file. No significant PMHx on review with patient     has no past surgical history on file.   No significant PSHx on review with patient    Social History     Socioeconomic History    Marital status: Single     Spouse name: Not on file    Number of children: Not on file    Pro-BNP <20 <300 pg/mL    BNP Interpretation Pro-BNP Reference Range:    EKG 12 Lead   Result Value Ref Range    Ventricular Rate 83 BPM    Atrial Rate 83 BPM    P-R Interval 164 ms    QRS Duration 80 ms    Q-T Interval 376 ms    QTc Calculation (Bazett) 441 ms    P Axis 17 degrees    R Axis 4 degrees    T Axis 8 degrees       IMPRESSION: 60year old patient presents after an episode of presyncope, on a background of recent viral syndrome symptoms and chronic back pain. Patient is afebrile, hemodynamically stable, and in no acute distress. Appropriately interactive and cooperative with interview and examination. Normal respiratory effort, lungs clear bilaterally, heart sounds normal, abdomen benign, neurological examination unremarkable. No chest pain or pulse discrepancy to suggest aortic pathology. Patient's mild shortness of breath has resolved, and there are no elements of history or findings on physical examination to suggest pulmonary embolism. Abdomen is entirely benign. Regarding patient's back pain, he has chronic lower back pain; there are absolutely no elements of history or findings on physical examination to suggest cauda equina or epidural abscess, or any other significant pathologic process. Symptoms and physical examination very consistent with sciatica. Patient's episode of presyncope most concerning for ACS or valvular disorder versus more likely orthostatic hypotension, given orthostatic vitals by EMS. Plan for cardiac evaluation, with CBC, CMP, troponin, BNP, EKG, chest x-ray. Orthostatic vitals here. Fluids. Reassess. HEART Risk Score for Chest Pain Patients                       Patient Score  History   Highly suspicious2            Moderately suspicious. ..1     = 1   Slightly or non suspicious. 0      ECG   Significant STD. ...2        Nonspecific repolarization1      = 0    Normal (no change from previous). .0      Age   >642 > 45 - <65. 1     = 1   < 46. ..0      Risk Factors  >2 risk factors.2     I - 2 risk factors. .1     = 0  No risk factors. ...0     Troponin   >3times normal limit. ..2      >1 time - <3 times normal limit. 1   = 0    Normal trop. Kwame Alan 0     -----------------------------------------------------------------------------------------      TOTAL RISK SCORE =  2          RISK % =  2.5        Risk Factors: DM, smoker (current or recent < mo), HTN, HLP, FHx CAD, obesity,   dsv add-ons   SLE, CKDz, HIV, cocaine abuse    Score 0 - 3 =  2.5% MACE over next 6 wks = Discharge home  Score 4 - 6 =  20.3% MACE over next 6 wks = Obs admit  Score 7 - 10 = 72.7% MACE over next 6 wks = Early invasive Rx        RADIOLOGY:  See radiology report    EKG  EKG Interpretation    Interpreted by me    Rhythm: normal sinus   Rate: 83  Axis: normal  Ectopy: none  Conduction: normal  ST Segments: no acute change  T Waves: no acute change  Q Waves: none    Clinical Impression: no acute changes    All EKG's are interpreted by the Emergency Department Physician who either signs or Co-signs this chart in the absence of a cardiologist.    EMERGENCY DEPARTMENT COURSE:    CBC, CMP, BNP, unremarkable. Initial trop 8. EKG unremarkable. Chest x-ray unremarkable. Patient awaiting second troponin. Case discussed with Dr. Ana Cristina Burton, who will assume care of this patient while he is awaiting his second troponin. Given patient's low risk heart score, if he is symptomatically improved and second troponin is unremarkable, he may likely be discharged with outpatient follow-up. PROCEDURES:  None    CONSULTS:  None    CRITICAL CARE:  None    FINAL IMPRESSION      1. Orthostatic hypotension    2. Pre-syncope    3. Sciatica of left side          DISPOSITION / PLAN     DISPOSITION        PATIENT REFERRED TO:  No follow-up provider specified.     DISCHARGE MEDICATIONS:  New Prescriptions

## 2020-03-19 NOTE — ED NOTES
Pt ambulated in hallway with no distress. Pt states he is nauseous. Pt denies dizziness or lightheadedness. Dr. Meryl cortez.      Sigifredo Patel, MACEY  03/19/20 0113

## 2020-03-19 NOTE — ED NOTES
PT arrived to the ED via EMS for c/o nausea that started this morning. Pt stated he ate breakfast this morning which was eggs and sausage. Later that morning started to feel nausea's and had 1 episode of vomiting. Pt reported that the emesis was clear and denied any blood in emesis. Pt reports abdominal pain at this time. Pt also reports back pain that is related to a bulging disc, and stated that this pain is now radiating to his buttocks. EMS reported that the patient was positive for orthostatic hypotension PTA. Upon arrival to the ED< pt placed on full cardiac monitor. EKG preformed and IV established. Will continue to monitor.      Juju Krishna RN  03/19/20 0800

## 2020-03-19 NOTE — ED NOTES
Report given to [facility]. All questions answered at this time.      Maite Rodriguez RN  03/19/20 3411

## 2020-03-19 NOTE — ED PROVIDER NOTES
Discharge    []  Transfer -    []  Admission -     []  Against Medical Advice   []  Eloped   FOLLOW-UP: Christopher Arita MD  [place] [address] [facility]    Schedule an appointment as soon as possible for a visit in 2 days  recheck diziness     DISCHARGE MEDICATIONS: Discharge Medication List as of 3/19/2020  8:50 AM      START taking these medications    Details   ondansetron (ZOFRAN ODT) 4 MG disintegrating tablet Take 1 tablet by mouth every 8 hours as needed for Nausea or Vomiting, Disp-6 tablet, R-0[facility]                 Crow Galan DO  Emergency Medicine Resident  [address] [place]  Resident  03/19/20 6169

## 2020-03-19 NOTE — ED NOTES
Report received from [facility]. No additional questions.      Miguelina Barroso RN  03/19/20 0729

## 2020-04-28 NOTE — PRE-CERTIFICATION NOTE
[x] [facility]) - [facility] &  Therapy  [address]  [phone]  F: [phone] [] [address]  [facility] [address]   [address]  P: [phone]  F: [phone] [] [address]  [address]  P: [phone]  F: [phone] [] [address] [facility]   [address]   [place]: [phone]  F: [phone]           Mary Riding   1965   [id number]    4/28/2020    Teressa Mason at [facility] notes from 3/5-3/12/20.   Fax: [phone], phone: [phone]    Electronically signed by Jose Henley PT on 4/28/2020 at 9:13 AM

## 2020-05-05 NOTE — PRE-CERTIFICATION NOTE
[x] [facility] - [facility] &  Therapy  [address]  [phone]  F: [phone] [] [address]   [address]  P: [phone]  F: [phone] [] [place]  [address]  P: [phone]  F: [phone] [] [address]  [facility]   [address]   [place]: [phone]  F: [phone]  [x] Kiet Deglado   Outpatient Occupational Therapy  [address]: [phone]  F: [phone]          Therapy Pre-certification Note      5/5/2020    Marie Fritz  1965   [id number]      Insurance approval was received for Physical Therapy from [address] [facility] on 5/5/20. Approval was received for 18 visits, from 5/4/20 to 6/29/20. Patient was contacted to be scheduled and voicemail was left.       Electronically signed by Oscar Chauhan PT on 5/5/2020 at 1:32 PM

## 2020-05-06 ENCOUNTER — HOSPITAL ENCOUNTER (OUTPATIENT)
Dept: PHYSICAL THERAPY | Age: 55
Setting detail: THERAPIES SERIES
Discharge: HOME | End: 2020-05-06
Payer: COMMERCIAL

## 2020-05-06 PROCEDURE — 97110 THERAPEUTIC EXERCISES: CPT

## 2020-05-06 NOTE — FLOWSHEET NOTE
distraction, added 5/6   Cane chest press   1 lb cane    Cane shoulder flexion  1 lb cane Hands close together   [employer] ER  1 lb cane    Cane abd  1 lb cane Very painful   Cane IR  1 lb cane Very painful         Seated       Pablo ER w/ scap retrac 10x     Upper trap stretch 3x15\"     Scapular depression 10x  demo   Cervical rotation       Levator stretch 5x10\"     Bicep curls   3 lb DB    Hammer    3 weights Supination/ pronation         Thera bands   Progressed resistance 5/6   -Ext 15x blueberry    -Triceps 15x blueberry    -Rows 15x blueberry    -IR 15x blueberry Elbow at side, bent to 90   -ER isodynamically 15x lime Added 5/6         Prone:      Rows 15x 1 lbs    Ext 15x 1 lbs    ts 10x A Added 5/6                     Ball on wall- Flexion/ext          Wax on/ wax off      Seated stool stretch-Flexion    10\"          Codman's with distraction using DB  2 lb ankle weight on wrist CW, CCW   Other: pt demonstrates slow movement throughout         Treatment Charges: Mins Units    []  Modalities   HP      [x]  Ther Exercise  51 4 2282-7558  [id number]   [x]  Manual Therapy  5 - 6783-8055   []  Ther Activities      []  Aquatics      []  Vasocompression      []  Other      Total Treatment time  56 4    UBE 2878-5449     Assessment: [x] Progressing toward goals. Added pectoralis stretching and manual for possible shldr impingement relief. .      [x] No change, many verbal cues for techniques with stretching and tband exercises needed. [x] Other: Completed exercises per log this date as patient tolerated. Patient still having pain with overhead reaching, abduction, and rotations. Pain limits exercises and increased ROM. Problems:    [x]? ? Pain:  [x]? ? ROM:  [x]? ? Strength:  [x]? ? Function:  []?  Other:     STG: (to be met in 9 treatments)  1. ? Pain: No greater than 3/10 pain reported -NOT MET  2. ? ROM: Symmetrical AROM in all planes of R shoulder with minimal pain at end range, especially horizontal

## 2020-05-12 ENCOUNTER — HOSPITAL ENCOUNTER (OUTPATIENT)
Dept: PHYSICAL THERAPY | Age: 55
Setting detail: THERAPIES SERIES
Discharge: HOME | End: 2020-05-12
Payer: COMMERCIAL

## 2020-05-12 PROCEDURE — 97110 THERAPEUTIC EXERCISES: CPT

## 2020-05-12 PROCEDURE — 97016 VASOPNEUMATIC DEVICE THERAPY: CPT

## 2020-05-12 NOTE — FLOWSHEET NOTE
horizontal add/abduction, abduction, and flexion. ROM has improved but experiences most pain with IR and abd -Continues to experience high pain levels with IR and abd.   3. ? Strength: 5/5 strength in R shoulder abd/flex/IR with only minimal pain reported with testing. - Improvement noted with flexion and ER but deficts in IR and abd  4. ? Function:   a. Pt will report improved ability to use RUE with UE dressing without increase in pain.  -Patient expresses pain donning on coat  b. Pt will be able to lift 20 lbs from waist height to overhead without difficulty and minimal increase in pain. -Patient is unable to complete light thera bands without increased pain  5. Independent with Home Exercise Programs  -Patient states he is compliant with HEP                  Patient goals: \"to return to work with no problems\"      Rehab Potential:  [x]? Good  []? Fair  []? Poor    Suggested Professional Referral:  [x]? No  []? Yes:  Barriers to Goal Achievement[de-identified]  []? No  [x]? Yes: High level of muscle guarding  Domestic Concerns:  [x]? No  []? Yes:    Pt. Education:  [x] Yes  [] No  [x] Reviewed Prior HEP/Ed  Method of Education: [x] Verbal  [x] Demo  [x] Written  Comprehension of Education:  [x] Verbalizes understanding. [x] Demonstrates understanding. [] Needs review. [x] Demonstrates/verbalizes HEP/Ed previously given. 5/6/20: doorway pectoralis stretch. Plan: [x] Continue for toward goals.   [x] Specific Instructions for subsequent treatments: recheck for Dr lubin, scapular strengthening, light pec stretching; add Shoulder isometrics, supine pec stretch      Time In: 5444       Time Out:  1713    Electronically signed by:  Kieran Orellana, PT

## 2020-05-15 ENCOUNTER — HOSPITAL ENCOUNTER (OUTPATIENT)
Dept: PHYSICAL THERAPY | Age: 55
Setting detail: THERAPIES SERIES
Discharge: HOME | End: 2020-05-15
Payer: COMMERCIAL

## 2020-05-15 PROCEDURE — 97016 VASOPNEUMATIC DEVICE THERAPY: CPT

## 2020-05-15 PROCEDURE — 97110 THERAPEUTIC EXERCISES: CPT

## 2020-05-15 NOTE — PROGRESS NOTES
[x] [facility] [address]  [address]  [phone]  F: [phone]        Physical Therapy Progress Note    Date: 5/15/2020      Patient: Jessica Romeo  : 1965  MRN: [id number]    Physician: Kristel Burton MD                               Insurance: [facility] ([facility]): 3x/3 wks until 3/14/20 [facility] on 20.  Approval was received for 18 visits, from 20 to 20. Medical Diagnosis: Unspecified sprain of right shoulder joint; strain of unspecified muscle, fascia, and tendon at shoulder and upper arm level, right arm  Rehab Codes: M25.511, M25.611, R29.3, R53.1  Onset Date: 20               Next 's appt: 2020 Dr. Alyssia Rodriguez      Total visits attended: 10  Cancels/No shows:10  Date range of services: 2020 to 05/15/2020      Subjective:  Pain:  [x]? Yes  []? No               Location: R shoulder               Pain Rating: (0-10 scale)   1/10 at rest 4/10 with activity. Pain altered Tx:  [x]? No  []? Yes  Action:  Comments: Pt reports when wakes up in morning shoulder is tender at 6/10, and at rest gets stiff. Pt w/set-back due to 6 weeks off PT due to Covid-19 crisis. Objective:  Test Measurements:   5/15 ROM A/P STRENGTH     RIGHT RIGHT   SEATED SHLD FLEX   XXXXXXXXX   SEATED SHD ABD   XXXXXXXXX   SEATED IR L4 XXXXXXXX           SHLD FLEX 103°p 4p   SHLD ABD 96°pp 4+   SHLD ER 67° 4+   SHLD IR 44° 4p           ELBOW FLEX   5   ELBOW EXT.   5                 Function: Pt reports cont to need to put R arm in shirts, jackets first, is unable to reach up over shoulder. Pt always uses L arm when lifting items. UEFI 53% loss of UE function.      Assessment:  STG:(to be met in 9 treatments)  1. ? Pain: No greater than 3/10 pain reported -Min Progress Toward  2. ? ROM: Symmetrical AROM in all planes of R shoulder with minimal pain at end range, especially horizontal add/abduction, abduction, and flexion- Progress Toward all but flexion  3. ? Strength: 5/5 strength in R shoulder abd/flex/IR with only minimal pain reported with testing -Min Progress Toward  4. ? Function:   a. Pt will report improved ability to use RUE with UE dressing without increase in pain. -Min change, Patient reports adjusting activity to avoid pain   b. Pt will be able to lift 20 lbs from waist height to overhead without difficulty and minimal increase in pain. - Progress Toward  5. Independent with Home Exercise Programs -Met            Treatment Plan:  [x] Therapeutic Exercise   65006  [] Iontophoresis: 4 mg/mL Dexamethasone Sodium Phosphate  mAmin  21203   [] Therapeutic Activity  10072 [x] Vasopneumatic cold with compression  10943    [] Gait Training   40567 [] Ultrasound   48631   [] Neuromuscular Re-education  20435 [] Electrical Stimulation Unattended  83232   [x] Manual Therapy  41802 [] Electrical Stimulation Attended  54736   [x] Instruction in HEP  [] Lumbar/Cervical Traction  93950   [] Aquatic Therapy   82299 [x] Cold/hotpack    [] Massage   20474      [] Dry Needling, 1 or 2 muscles  04744   [] Biofeedback, first 15 minutes   23615  [] Biofeedback, additional 15 minutes   26558 [] Dry Needling, 3 or more muscles  95783       Patient Status:     [x] Continue per initial plan of care. [] Additional visits necessary. [x] Other: Pt w/16 Rx remaining on current WC auth, plan to cont for remaining Rx. Pt demonstrates gradual progress in ROM, cont to be limited in pain, strength, and function. Pt would benefit from additional skilled physical therapy to address these deficits to promote return to normal function and return to work. Electronically signed by Fatemeh Nevarez PT on 5/15/2020 at 4:07 PM        If you have any questions or concerns, please don't hesitate to call.   Thank you for your referral.    Physician Signature:________________________________Date:__________________  By signing above or cosigning this note, I have reviewed this

## 2020-05-19 ENCOUNTER — HOSPITAL ENCOUNTER (OUTPATIENT)
Dept: PHYSICAL THERAPY | Age: 55
Setting detail: THERAPIES SERIES
Discharge: HOME | End: 2020-05-19
Payer: COMMERCIAL

## 2020-05-19 PROCEDURE — 97110 THERAPEUTIC EXERCISES: CPT

## 2020-05-19 PROCEDURE — 97016 VASOPNEUMATIC DEVICE THERAPY: CPT

## 2020-05-19 NOTE — FLOWSHEET NOTE
lean to L   Bicep curls  15x 3 lb DB    Hammer    3 weights Supination/ pronation         Thera bands      -Ext 15x blueberry Top hook    -Triceps 15x blueberry Top hook   -Rows 15x blueberry Hooked above shoulders   -IR 15x blueberry Elbow at side, bent to 90   -ER  15x lime    -Flex to 30° 10x blueberry Band waist high behind Pt   -shldr depression 10x blueberry Top hook   -biceps  15x blueberry Bottom hook   Prone:      Rows 15x 1 lbs    Ext 15x 1 lbs    ts -- A    Sidelying       ER  15x  Added 5/19, add weight soon         Ball on wall- Flexion/ext          Wax on/ wax off      Seated stool stretch-Flexion    10\"          Codman's with distraction using DB  2 lb ankle weight on wrist CW, CCW   Other:      5/15 ROM A/P STRENGTH    RIGHT RIGHT   SEATED SHLD FLEX  XXXXXXXXX   SEATED SHD ABD  XXXXXXXXX   SEATED IR (checked 5/19) L4 XXXXXXXX        SHLD FLEX 103°p 4p   SHLD ABD 96°pp 4+   SHLD ER 67° 4+   SHLD IR 44° 4p        ELBOW FLEX  5   ELBOW EXT. 5              Treatment Charges: Mins Units    []  Modalities   HP      [x]  Ther Exercise 42 3 8181-5141 0164-6974   [x]  Manual Therapy 6 -- 9989-1837   []  Ther Activities      []  Aquatics      [x]  Vasocompression 15 3 7216-7057   []  Other      Total Treatment time 63 min     UBE 1043-8012    Assessment: [] Progressing toward goals. Initiated pec stretch and sidelying ER to improve GH stability. Cont game ready at end of Rx to decrease pain, inflammation. [x] No change-Pt cont w/pain w/multiple exercises. [] Other:           STG: (to be met in 9 treatments)  1. ? Pain: No greater than 3/10 pain reported -Min Progress Toward  2. ? ROM: Symmetrical AROM in all planes of R shoulder with minimal pain at end range, especially horizontal add/abduction, abduction, and flexion- Progress Toward all but flexion  3. ? Strength: 5/5 strength in R shoulder abd/flex/IR with only minimal pain reported with testing -Min Progress Toward  4. ? Function:   a.  Pt will report improved ability to use RUE with UE dressing without increase in pain. -Min change, Patient reports adjusting activity to avoid pain   b. Pt will be able to lift 20 lbs from waist height to overhead without difficulty and minimal increase in pain. - Progress Toward  5. Independent with Home Exercise Programs -Met                  Patient goals: \"to return to work with no problems\"      Rehab Potential:  [x]? Good  []? Fair  []? Poor    Suggested Professional Referral:  [x]? No  []? Yes:  Barriers to Goal Achievement[de-identified]  []? No  [x]? Yes: High level of muscle guarding  Domestic Concerns:  [x]? No  []? Yes:    Pt. Education:  [x] Yes  [] No  [x] Reviewed Prior HEP/Ed  Method of Education: [x] Verbal  [x] Demo  [x] Written  Comprehension of Education:  [x] Verbalizes understanding. [x] Demonstrates understanding. [] Needs review. [x] Demonstrates/verbalizes HEP/Ed previously given. 5/6/20: doorway pectoralis stretch. 5/19 flex, ext IR, ER isometrics, scapular retractions      Plan: [x] Continue for toward goals.   [x] Specific Instructions for subsequent treatments: add supine IR PRE, add weight to ER in sidelying scapular strengthening      Time In: 1601       Time Out:  1713    Electronically signed by:  Shay Garcia, PT

## 2020-05-22 ENCOUNTER — HOSPITAL ENCOUNTER (OUTPATIENT)
Dept: PHYSICAL THERAPY | Age: 55
Setting detail: THERAPIES SERIES
Discharge: HOME | End: 2020-05-22
Payer: COMMERCIAL

## 2020-05-26 ENCOUNTER — HOSPITAL ENCOUNTER (OUTPATIENT)
Dept: PHYSICAL THERAPY | Age: 55
Setting detail: THERAPIES SERIES
End: 2020-05-26
Payer: COMMERCIAL

## 2020-05-29 ENCOUNTER — APPOINTMENT (OUTPATIENT)
Dept: PHYSICAL THERAPY | Age: 55
End: 2020-05-29
Payer: COMMERCIAL

## 2020-09-14 NOTE — DISCHARGE SUMMARY
[x] Josh Chan        Outpatient Physical                Therapy       [address]       Phone: [phone]       Fax: [phone] [] [facility]       Promotion at [address] [place] [address]       Phone: [phone]       Fax: [phone] [] Adriane Jacinto [facility]      for Health Promotion     [address] [facility]     Phone: [phone]     Fax:  [phone]     Physical Therapy Discharge Note    Date: 2020      Patient: Colleen Whitaker  : 1965  MRN: [id number]    Physician: Kristel Ferrell MD                               Insurance: [facility] ([facility]): 3x/3 wks until 3/14/20 [facility] on 20.  Approval was received for 18 visits, from 20 to 20. Medical Diagnosis: Unspecified sprain of right shoulder joint; strain of unspecified muscle, fascia, and tendon at shoulder and upper arm level, right arm  Rehab Codes: M25.511, M25.611, R29.3, R53.1  Onset Date: 20               Next 's appt: 2020 Dr. Jody Phillips      Visit# / total visits:                Cancels/No Shows:   Date of initial visit: 20                Date of final visit: 20       Discharge Status:     Pt was put on hold from therapy d/t undergoing surgery. Pt has not contacted clinic to get back into PT for re-eval post surgery, and no new referral has been made. Pt. Is now discharged. Electronically signed by: Sundar Howard PT    If you have any questions or concerns, please don't hesitate to call.   Thank you for your referral.

## 2021-05-11 ENCOUNTER — HOSPITAL ENCOUNTER (EMERGENCY)
Age: 56
Discharge: HOME | End: 2021-05-11
Attending: EMERGENCY MEDICINE
Payer: COMMERCIAL

## 2021-05-11 VITALS
HEART RATE: 70 BPM | SYSTOLIC BLOOD PRESSURE: 141 MMHG | RESPIRATION RATE: 16 BRPM | TEMPERATURE: 98.7 F | DIASTOLIC BLOOD PRESSURE: 81 MMHG | OXYGEN SATURATION: 98 %
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
## 2021-05-11 DIAGNOSIS — S39.012A STRAIN OF LUMBAR REGION, INITIAL ENCOUNTER: ICD-10-CM

## 2021-05-11 DIAGNOSIS — M54.2 NECK PAIN: ICD-10-CM

## 2021-05-11 PROCEDURE — 6360000002 HC RX W HCPCS: Performed by: EMERGENCY MEDICINE

## 2021-05-11 PROCEDURE — 96372 THER/PROPH/DIAG INJ SC/IM: CPT

## 2021-05-11 PROCEDURE — 6370000000 HC RX 637 (ALT 250 FOR IP): Performed by: EMERGENCY MEDICINE

## 2021-05-11 PROCEDURE — 99283 EMERGENCY DEPT VISIT LOW MDM: CPT

## 2021-05-11 RX ORDER — IBUPROFEN 800 MG/1
800 TABLET ORAL EVERY 6 HOURS PRN
Qty: 21 TABLET | Refills: 0 | Status: SHIPPED | OUTPATIENT
Start: 2021-05-11 | End: 2021-09-20 | Stop reason: ALTCHOICE

## 2021-05-11 RX ORDER — LIDOCAINE 50 MG/G
1 PATCH TOPICAL DAILY
Qty: 30 PATCH | Refills: 0 | Status: SHIPPED | OUTPATIENT
Start: 2021-05-11 | End: 2021-06-10

## 2021-05-11 RX ORDER — LIDOCAINE 4 G/G
1 PATCH TOPICAL ONCE
Status: DISCONTINUED | OUTPATIENT
Start: 2021-05-11 | End: 2021-05-11 | Stop reason: HOSPADM

## 2021-05-11 RX ORDER — ACETAMINOPHEN 500 MG
1000 TABLET ORAL ONCE
Status: COMPLETED | OUTPATIENT
Start: 2021-05-11 | End: 2021-05-11

## 2021-05-11 RX ORDER — ORPHENADRINE CITRATE 30 MG/ML
60 INJECTION INTRAMUSCULAR; INTRAVENOUS ONCE
Status: COMPLETED | OUTPATIENT
Start: 2021-05-11 | End: 2021-05-11

## 2021-05-11 RX ORDER — ACETAMINOPHEN 500 MG
1000 TABLET ORAL 3 TIMES DAILY
Qty: 60 TABLET | Refills: 0 | Status: SHIPPED | OUTPATIENT
Start: 2021-05-11 | End: 2021-09-20 | Stop reason: ALTCHOICE

## 2021-05-11 RX ORDER — IBUPROFEN 800 MG/1
800 TABLET ORAL ONCE
Status: COMPLETED | OUTPATIENT
Start: 2021-05-11 | End: 2021-05-11

## 2021-05-11 RX ORDER — TIZANIDINE 4 MG/1
4 TABLET ORAL 3 TIMES DAILY PRN
Qty: 30 TABLET | Refills: 0 | Status: SHIPPED | OUTPATIENT
Start: 2021-05-11

## 2021-05-11 RX ADMIN — ORPHENADRINE CITRATE 60 MG: 30 INJECTION INTRAMUSCULAR; INTRAVENOUS at 17:46

## 2021-05-11 RX ADMIN — ACETAMINOPHEN 1000 MG: 500 TABLET ORAL at 17:46

## 2021-05-11 RX ADMIN — IBUPROFEN 800 MG: 800 TABLET, FILM COATED ORAL at 17:46

## 2021-05-11 ASSESSMENT — ENCOUNTER SYMPTOMS
VOMITING: 0
CONSTIPATION: 0
NAUSEA: 0
SHORTNESS OF BREATH: 0
BACK PAIN: 1
ABDOMINAL PAIN: 0
DIARRHEA: 0
SORE THROAT: 0

## 2021-05-11 ASSESSMENT — PAIN SCALES - GENERAL: PAINLEVEL_OUTOF10: 8
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
## 2021-09-20 ENCOUNTER — HOSPITAL ENCOUNTER (EMERGENCY)
Age: 56
Discharge: HOME | End: 2021-09-20
Attending: EMERGENCY MEDICINE
Payer: COMMERCIAL

## 2021-09-20 VITALS
WEIGHT: 220 LBS | OXYGEN SATURATION: 93 % | TEMPERATURE: 98 F | RESPIRATION RATE: 16 BRPM | HEART RATE: 66 BPM | DIASTOLIC BLOOD PRESSURE: 107 MMHG | BODY MASS INDEX: 29.84 KG/M2 | SYSTOLIC BLOOD PRESSURE: 146 MMHG

## 2021-09-20 DIAGNOSIS — K40.90 LEFT INGUINAL HERNIA: Primary | ICD-10-CM

## 2021-09-20 LAB
ABSOLUTE EOS #: 0.06 K/UL (ref 0–0.44)
ABSOLUTE IMMATURE GRANULOCYTE: <0.03 K/UL (ref 0–0.3)
ABSOLUTE LYMPH #: 1.55 K/UL (ref 1.1–3.7)
ABSOLUTE MONO #: 0.29 K/UL (ref 0.1–1.2)
ALBUMIN SERPL-MCNC: 4.5 G/DL (ref 3.5–5.2)
ALBUMIN/GLOBULIN RATIO: 1.6 (ref 1–2.5)
ALP BLD-CCNC: 54 U/L (ref 40–129)
ALT SERPL-CCNC: 25 U/L (ref 5–41)
AMPHETAMINE SCREEN URINE: NEGATIVE
ANION GAP SERPL CALCULATED.3IONS-SCNC: 12 MMOL/L (ref 9–17)
AST SERPL-CCNC: 25 U/L
BARBITURATE SCREEN URINE: NEGATIVE
BASOPHILS # BLD: 1 % (ref 0–2)
BASOPHILS ABSOLUTE: 0.03 K/UL (ref 0–0.2)
BENZODIAZEPINE SCREEN, URINE: NEGATIVE
BILIRUB SERPL-MCNC: 0.88 MG/DL (ref 0.3–1.2)
BUN BLDV-MCNC: 14 MG/DL (ref 6–20)
BUN/CREAT BLD: ABNORMAL (ref 9–20)
BUPRENORPHINE URINE: NORMAL
CALCIUM SERPL-MCNC: 9.5 MG/DL (ref 8.6–10.4)
CANNABINOID SCREEN URINE: NEGATIVE
CHLORIDE BLD-SCNC: 102 MMOL/L (ref 98–107)
CO2: 24 MMOL/L (ref 20–31)
COCAINE METABOLITE, URINE: NEGATIVE
CREAT SERPL-MCNC: 0.86 MG/DL (ref 0.7–1.2)
DIFFERENTIAL TYPE: NORMAL
EOSINOPHILS RELATIVE PERCENT: 2 % (ref 1–4)
GFR AFRICAN AMERICAN: >60 ML/MIN
GFR NON-AFRICAN AMERICAN: >60 ML/MIN
GFR SERPL CREATININE-BSD FRML MDRD: ABNORMAL ML/MIN/{1.73_M2}
GFR SERPL CREATININE-BSD FRML MDRD: ABNORMAL ML/MIN/{1.73_M2}
GLUCOSE BLD-MCNC: 147 MG/DL (ref 70–99)
HCT VFR BLD CALC: 47.5 % (ref 40.7–50.3)
HEMOGLOBIN: 15 G/DL (ref 13–17)
IMMATURE GRANULOCYTES: 0 %
INR BLD: 1
LYMPHOCYTES # BLD: 43 % (ref 24–43)
MCH RBC QN AUTO: 28.5 PG (ref 25.2–33.5)
MCHC RBC AUTO-ENTMCNC: 31.6 G/DL (ref 28.4–34.8)
MCV RBC AUTO: 90.3 FL (ref 82.6–102.9)
MDMA URINE: NORMAL
METHADONE SCREEN, URINE: NEGATIVE
METHAMPHETAMINE, URINE: NORMAL
MONOCYTES # BLD: 8 % (ref 3–12)
NRBC AUTOMATED: 0 PER 100 WBC
OPIATES, URINE: NEGATIVE
OXYCODONE SCREEN URINE: NEGATIVE
PARTIAL THROMBOPLASTIN TIME: 24.4 SEC (ref 20.5–30.5)
PDW BLD-RTO: 12.8 % (ref 11.8–14.4)
PHENCYCLIDINE, URINE: NEGATIVE
PLATELET # BLD: 300 K/UL (ref 138–453)
PLATELET ESTIMATE: NORMAL
PMV BLD AUTO: 9.2 FL (ref 8.1–13.5)
POTASSIUM SERPL-SCNC: 4 MMOL/L (ref 3.7–5.3)
PROPOXYPHENE, URINE: NORMAL
PROTHROMBIN TIME: 10.3 SEC (ref 9.1–12.3)
RBC # BLD: 5.26 M/UL (ref 4.21–5.77)
RBC # BLD: NORMAL 10*6/UL
SARS-COV-2, RAPID: NOT DETECTED
SEG NEUTROPHILS: 46 % (ref 36–65)
SEGMENTED NEUTROPHILS ABSOLUTE COUNT: 1.64 K/UL (ref 1.5–8.1)
SODIUM BLD-SCNC: 138 MMOL/L (ref 135–144)
SPECIMEN DESCRIPTION: NORMAL
TEST INFORMATION: NORMAL
TOTAL PROTEIN: 7.3 G/DL (ref 6.4–8.3)
TRICYCLIC ANTIDEPRESSANTS, UR: NORMAL
WBC # BLD: 3.6 K/UL (ref 3.5–11.3)
WBC # BLD: NORMAL 10*3/UL

## 2021-09-20 PROCEDURE — 85025 COMPLETE CBC W/AUTO DIFF WBC: CPT

## 2021-09-20 PROCEDURE — 85610 PROTHROMBIN TIME: CPT

## 2021-09-20 PROCEDURE — 99285 EMERGENCY DEPT VISIT HI MDM: CPT

## 2021-09-20 PROCEDURE — 87635 SARS-COV-2 COVID-19 AMP PRB: CPT

## 2021-09-20 PROCEDURE — 85730 THROMBOPLASTIN TIME PARTIAL: CPT

## 2021-09-20 PROCEDURE — 6360000002 HC RX W HCPCS: Performed by: HEALTH CARE PROVIDER

## 2021-09-20 PROCEDURE — 96374 THER/PROPH/DIAG INJ IV PUSH: CPT

## 2021-09-20 PROCEDURE — 80053 COMPREHEN METABOLIC PANEL: CPT

## 2021-09-20 PROCEDURE — 80307 DRUG TEST PRSMV CHEM ANLYZR: CPT

## 2021-09-20 RX ORDER — OXYCODONE HYDROCHLORIDE AND ACETAMINOPHEN 5; 325 MG/1; MG/1
1-2 TABLET ORAL EVERY 6 HOURS PRN
Qty: 4 TABLET | Refills: 0 | Status: SHIPPED | OUTPATIENT
Start: 2021-09-20 | End: 2021-09-21

## 2021-09-20 RX ORDER — MORPHINE SULFATE 4 MG/ML
4 INJECTION, SOLUTION INTRAMUSCULAR; INTRAVENOUS ONCE
Status: COMPLETED | OUTPATIENT
Start: 2021-09-20 | End: 2021-09-20

## 2021-09-20 RX ADMIN — MORPHINE SULFATE 4 MG: 4 INJECTION INTRAVENOUS at 10:27

## 2021-09-20 ASSESSMENT — ENCOUNTER SYMPTOMS
VOMITING: 0
ABDOMINAL PAIN: 1
TROUBLE SWALLOWING: 0
BACK PAIN: 0
NAUSEA: 0
DIARRHEA: 0
SHORTNESS OF BREATH: 0

## 2021-09-20 ASSESSMENT — PAIN SCALES - GENERAL
PAINLEVEL_OUTOF10: 10
PAINLEVEL_OUTOF10: 10

## 2021-09-20 NOTE — LETTER
[facility]  [address] [id number]  Phone: [phone]         September 20, 2021     Patient: Madi Villanueva   YOB: 1965   Date of Visit: 9/20/2021       To Whom It May Concern:    Shanthi Smith was seen and treated in our emergency department on 9/20/2021. The following work duties are recommended. He may return to work on 9/24/2021 with the following restrictions: lifting/carrying not to exceed 10 lbs. , pushing/pulling not to exceed 10 lbs. Treatment and work recommendations given by the emergency department are initial emergency measures only, and follow up should be arranged as soon as possible with the company's occupational health provider* or the specialist to whom the worker was referred.     *If the company does not have an occupational health provider, follow up should be at [facility]  [address] [place] [address]  [phone]    If symptoms worsen      Sincerely,        Mariusz Briceño MD        Signature:__________________________________

## 2021-09-20 NOTE — ED PROVIDER NOTES
[facility] ED  Emergency Department Encounter  Emergency Medicine Resident     Pt Name: Alcides Batista  MRN: [id number]  [place] 1965  Date of evaluation: 9/20/21  PCP:  Dipika Hanson MD    CHIEF COMPLAINT       Chief Complaint   Patient presents with    Abdominal Pain     lifting heavy objects at work, hx of herinas       HISTORY OFPRESENT ILLNESS  (Location/Symptom, Timing/Onset, Context/Setting, Quality, Duration, Modifying Factors,Severity.)      Alcides Batista is a 60 y.o. male who presents with left inguinal pain. 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 He immediately felt a popping sensation and severe pain in his left groin with a bulge. Pain at that time was 10/10 severity localized to the left groin with radiation into the abdomen. Patient was able to reduce the bulge and this alleviated the pain. Exacerbated with coughing. He is passing flatus and last bowel movement was yesterday. Has a history of right inguinal hernia, which was previously repaired. Reports similar symptoms. He denies fevers, chills, chest pain, shortness of breath, nausea, vomiting, diarrhea, or testicular pain.     PAST MEDICAL / SURGICAL / SOCIAL / FAMILY HISTORY     Denies past medical history    History of right inguinal hernia repair    Social History     Socioeconomic History    Marital status: Single     Spouse name: Not on file    Number of children: Not on file    Years of education: Not on file    Highest education level: Not on file   Occupational History    Not on file   Tobacco Use    Smoking status: Never Smoker    Smokeless tobacco: Never Used   Substance and Sexual Activity    Alcohol use: Yes     Comment: socially    Drug use: No    Sexual activity: Not on file   Other Topics Concern    Not on file   Social History Narrative    Not on file     Social Determinants of Health     Financial Resource Strain:     Difficulty of Paying Living Expenses:    Food Insecurity:     Worried About Running Out of Food in the Last Year:     [address] in the Last Year:    Transportation Needs:     Lack of Transportation (Medical):  Lack of Transportation (Non-Medical):    Physical Activity:     Days of Exercise per Week:     Minutes of Exercise per Session:    Stress:     Feeling of Stress :    Social Connections:     Frequency of Communication with Friends and Family:     Frequency of Social Gatherings with Friends and Family:     Attends Hinduism Services:     Active Member of Clubs or Organizations:     Attends Club or Organization Meetings:     Marital Status:    Intimate Partner Violence:     Fear of Current or Ex-Partner:     Emotionally Abused:     Physically Abused:     Sexually Abused:        No pertinent family history    Allergies:  Patient has no known allergies. Home Medications:  Prior to Admission medications    Medication Sig Start Date End Date Taking? Authorizing Provider   oxyCODONE-acetaminophen (PERCOCET) 5-325 MG per tablet Take 1-2 tablets by mouth every 6 hours as needed for Pain for up to 4 doses. WARNING:  May cause drowsiness. May impair ability to operate vehicles or machinery. Do not use in combination with alcohol.  9/20/21 9/21/21 Yes Gina Parry MD   acetaminophen (TYLENOL) 500 MG tablet Take 2 tablets by mouth 3 times daily for 10 days 5/11/21 5/21/21  Aury Stratton MD   ibuprofen (IBU) 800 MG tablet Take 1 tablet by mouth every 6 hours as needed for Pain 5/11/21   Aury Stratton MD   tiZANidine (ZANAFLEX) 4 MG tablet Take 1 tablet by mouth 3 times daily as needed (Muscle spasm) 5/11/21   Aury Stratton MD   ondansetron (ZOFRAN ODT) 4 MG disintegrating tablet Take 1 tablet by mouth every 8 hours as needed for Nausea or Vomiting 3/19/20   Nico Braswell, DO       REVIEW OFSYSTEMS    (2-9 systems for level 4, 10 or more for level 5)      Review of Systems Constitutional: Negative for chills and fever. HENT: Negative for trouble swallowing. Eyes: Negative for visual disturbance. Respiratory: Negative for shortness of breath. Cardiovascular: Negative for chest pain. Gastrointestinal: Positive for abdominal pain. Negative for diarrhea, nausea and vomiting. Genitourinary: Negative for dysuria and testicular pain. Musculoskeletal: Negative for back pain. Allergic/Immunologic: Negative for immunocompromised state. Neurological: Negative for dizziness. Hematological: Does not bruise/bleed easily. PHYSICAL EXAM   (up to 7 for level 4, 8 or more forlevel 5)      INITIAL VITALS:   ED Triage Vitals [09/20/21 0927]   BP Temp Temp Source Pulse Resp SpO2 Height Weight   (!) 148/87 98 °F (36.7 °C) Oral 66 16 95 % -- 220 lb (99.8 kg)       Physical Exam  Vitals reviewed. Constitutional:       General: He is not in acute distress. Appearance: He is well-developed. He is not ill-appearing. HENT:      Head: Normocephalic and atraumatic. Mouth/Throat:      Mouth: Mucous membranes are moist.      Pharynx: Oropharynx is clear. Eyes:      General: No scleral icterus. Extraocular Movements: Extraocular movements intact. Cardiovascular:      Rate and Rhythm: Normal rate and regular rhythm. Heart sounds: Normal heart sounds. Pulmonary:      Effort: Pulmonary effort is normal.      Breath sounds: Normal breath sounds. Abdominal:      General: Abdomen is protuberant. Bowel sounds are normal.      Palpations: Abdomen is soft. Tenderness: There is abdominal tenderness in the left lower quadrant. There is no guarding or rebound. Hernia: A hernia is present. Hernia is present in the left inguinal area. Comments: Large left inguinal hernia palpated when patient performed Valsalva maneuver. Hernia was significantly tender to palpation, but easily reducible.    Genitourinary:     Penis: Normal.       Testes: Normal.   Skin: AUTO DIFFERENTIAL   PROTIME-INR   APTT   URINE DRUG SCREEN   PREVIOUS SPECIMEN   TYPE AND SCREEN         RADIOLOGY:  No results found. EMERGENCY DEPARTMENT COURSE:  ED Course as of Sep 20 1251   Mon Sep 20, 2021   1250 She was seen and evaluated at bedside by general surgery. They have scheduled him for outpatient surgical pair tomorrow. Will obtain COVID-19 screening swab in order for OR clearance. Plan to discharge home with Percocet for pain control overnight. Return precautions given. [GG]      ED Course User Index  [GG] Ginger Gillette MD          PROCEDURES:  None    CONSULTS:  IP CONSULT TO GENERAL SURGERY    CRITICAL CARE:  Please see attending note    FINAL IMPRESSION      1. Left inguinal hernia          DISPOSITION / PLAN     DISPOSITION Decision To Discharge 09/20/2021 12:48:24 PM      PATIENT REFERRED TO:  [facility]  [address] [place] [address]  [phone]    If symptoms worsen      DISCHARGE MEDICATIONS:  New Prescriptions    OXYCODONE-ACETAMINOPHEN (PERCOCET) 5-325 MG PER TABLET    Take 1-2 tablets by mouth every 6 hours as needed for Pain for up to 4 doses. WARNING:  May cause drowsiness. May impair ability to operate vehicles or machinery. Do not use in combination with alcohol.        Ginger Gillette MD  Emergency Medicine Resident    (Please note that portions of this note were completed with a voice recognition program.Efforts were made to edit the dictations but occasionally words are mis-transcribed.)        Ginger Gillette MD  Resident  09/20/21 5957

## 2021-09-20 NOTE — ED NOTES
Pt arrived to ED via EMS with c/o of abdominal pain. Pt states he has a history of herinas. Pt was at work lifting a heavy object when he began to have abdominal pain. Pt states the pain radiates to his flank and back. Pt is alert and oriented x4.      Cady Teresa RN  09/20/21 0699

## 2021-09-20 NOTE — ED PROVIDER NOTES
[facility]     Emergency Department     Faculty Attestation        I performed a history and physical examination of the patient and discussed management with the resident. I reviewed the residents note and agree with the documented findings and plan of care. Any areas of disagreement are noted on the chart. I was personally present for the key portions of any procedures. I have documented in the chart those procedures where I was not present during the key portions. I have reviewed the emergency nurses triage note. I agree with the chief complaint, past medical history, past surgical history, allergies, medications, social and family history as documented unless otherwise noted below. For mid-level providers such as nurse practitioners as well as physicians assistants:    I have personally seen and evaluated the patient. I find the patient's history and physical exam are consistent with NP/PA documentation. I agree with the care provided, treatment rendered, disposition, & follow-up plan. Additional findings are as noted. Vital Signs: BP (!) 148/87   Pulse 66   Temp 98 °F (36.7 °C) (Oral)   Resp 16   Wt 220 lb (99.8 kg)   SpO2 95%   BMI 29.84 kg/m²   PCP:  Elan Maurer MD    Pertinent Comments:     Patient presents with abdominal pain he was lifting a heavy object when he felt a pop in his left inguinal area and then developed a bulge he is a reducible left inguinal hernia.   He appears uncomfortable but is not overtly toxic we will check labs discussed with surgery, pain control, reassessment      Critical Care  None          Rohit Painting MD    Attending Emergency Medicine Physician            Corey Barrios MD  09/20/21 9898

## 2021-09-20 NOTE — ED NOTES
Bed: 34  Expected date:   Expected time:   Means of arrival:   Comments:  [facility]  09/20/21 0509

## 2021-09-20 NOTE — CONSULTS
General Surgery:  Consult Note        PATIENT NAME: Pati Horton   YOB: 1965    ADMISSION DATE: 9/20/2021  9:25 AM     Admitting Provider: Kyle Nava Physician: Dr. Verlan Schaumann: 9/20/2021    Chief Complaint:  Abdominal pain  Consult Regarding:  Left inguinal hernia repair    HISTORY OF PRESENT ILLNESS:  The patient is a 60 y.o. male who presented to the ED on September 20 complaining of abdominal pain. He was at work and lifted something heavy when he felt a pop in his left groin. Has had abdominal pain that radiates to his back. It has improved with pain medication since being in the ER. He denies any change in bowel habits, no hematochezia, no melena. Denies nausea, vomiting, chest pain, shortness of breath. He reports he is able to push the bulge back in however it pops out right away, especially when standing up. This is never happened on this side before, however he has had a right inguinal hernia repair in the past.  He does not remember who did the repair. He has a past surgical history of right inguinal hernia repair, right shoulder repair. No medical history. He denies diabetes, heart disease, lung disease. He does not take any medications. He does not smoke. He drinks 2-3 beers a few times a week. Past Medical History:    No past medical history on file. Past Surgical History:    No past surgical history on file. Medications Prior to Admission:   Not in a hospital admission. Allergies:  Patient has no known allergies.     Social History:   Social History     Socioeconomic History    Marital status: Single     Spouse name: Not on file    Number of children: Not on file    Years of education: Not on file    Highest education level: Not on file   Occupational History    Not on file   Tobacco Use    Smoking status: Never Smoker    Smokeless tobacco: Never Used   Substance and Sexual Activity    Alcohol use: Yes     Comment: socially Drug use: No    Sexual activity: Not on file   Other Topics Concern    Not on file   Social History Narrative    Not on file     Social Determinants of Health     Financial Resource Strain:     Difficulty of Paying Living Expenses:    Food Insecurity:     Worried About [address] in the Last Year:     Ran Out of Food in the Last Year:    Transportation Needs:     Lack of Transportation (Medical):     Lack of Transportation (Non-Medical):    Physical Activity:     Days of Exercise per Week:     Minutes of Exercise per Session:    Stress:     Feeling of Stress :    Social Connections:     Frequency of Communication with Friends and Family:     Frequency of Social Gatherings with Friends and Family:     Attends [employer] Services: Active Member of Clubs or Organizations:     Attends Club or Organization Meetings:     Marital Status:    Intimate Partner Violence:     Fear of Current or Ex-Partner:     Emotionally Abused:     Physically Abused:     Sexually Abused:        Family History:   No family history on file. REVIEW OF SYSTEMS:    CONSTITUTIONAL: Denies recent weight loss, fatigue, fevers, chills. HEENT: Denies rhinorrhea, dysphagia, odynphagia. CARDIOVASCULAR: Denies history of MI, recent chest pain. RESPIRATORY: Denies recent history of shortness of breath or history of PE. GASTROINTESTINAL: Abdominal pain, no nausea, no vomiting, no change in bowel movements  GENITOURINARY: Denies increased frequency or dysuria. HEMATOLOGIC/LYMPHATIC: Denies history of anemia or DVTs. ENDOCRINE: Denies history of thyroid problems or diabetes. NEURO: Denies history of CVA, TIA. Review of systems negative unless listed above.     PHYSICAL EXAM:    VITALS:  BP (!) 146/107   Pulse 66   Temp 98 °F (36.7 °C) (Oral)   Resp 16   Wt 220 lb (99.8 kg)   SpO2 93%   BMI 29.84 kg/m²   INTAKE/OUTPUT:   No intake or output data in the 24 hours ending 09/20/21 1239    CONSTITUTIONAL:  awake, alert, not distressed and mildly obese  HEENT: Normocephalic/atraumatic, without obvious abnormality. NECK:  Supple, symmetrical, trachea midline   CARDIOVASCULAR: Regular rate and rhythm  LUNGS: equal chest rise and fall, no increased WOB, no audible stridor or wheeze  ABDOMEN: slightly tender over left inguinal hernia, easily reducible but pops right back out, no erythema or induration over the hernia site. MUSCULOSKELETAL: Muscle strength intact in all extremities bilaterally. NEUROLOGIC: Gross motor intact without focal weakness. SKIN: No cyanosis, rashes, or edema noted. Orientation:   oriented to person, place, and time    CBC:   Lab Results   Component Value Date    WBC 3.6 09/20/2021    RBC 5.26 09/20/2021    RBC 4.67 11/16/2011    HGB 15.0 09/20/2021    HCT 47.5 09/20/2021    MCV 90.3 09/20/2021    MCH 28.5 09/20/2021    MCHC 31.6 09/20/2021    RDW 12.8 09/20/2021     09/20/2021     11/16/2011    MPV 9.2 09/20/2021     BMP:    Lab Results   Component Value Date     09/20/2021    K 4.0 09/20/2021     09/20/2021    CO2 24 09/20/2021    BUN 14 09/20/2021    LABALBU 4.5 09/20/2021    CREATININE 0.86 09/20/2021    CALCIUM 9.5 09/20/2021    GFRAA >60 09/20/2021    LABGLOM >60 09/20/2021    GLUCOSE 147 09/20/2021       Pertinent Radiology:   No results found. ASSESSMENT:  Active Hospital Problems    Diagnosis Date Noted    Left inguinal hernia [K40.90] 09/20/2021       78-year-old male with reducible left inguinal hernia. Plan:  Discharge per ED. We will plan on doing an elective repair tomorrow morning of the left inguinal hernia. Plan is robotic assisted laparoscopic inguinal hernia repair with mesh. All risks and benefits were explained to the patient and all questions were answered. He will most likely be able to go home same day. Rapid Covid test in the ER.       Electronically signed by Easton Jose DO  on 9/20/2021 at 12:39 PM     Attending Physician Statement  I have

## 2021-09-21 ENCOUNTER — ANESTHESIA EVENT (OUTPATIENT)
Dept: OPERATING ROOM | Age: 56
End: 2021-09-21
Payer: COMMERCIAL

## 2021-09-21 ENCOUNTER — ANESTHESIA (OUTPATIENT)
Dept: OPERATING ROOM | Age: 56
End: 2021-09-21
Payer: COMMERCIAL

## 2021-09-21 ENCOUNTER — HOSPITAL ENCOUNTER (OUTPATIENT)
Age: 56
Setting detail: OUTPATIENT SURGERY
Discharge: HOME | End: 2021-09-21
Attending: SURGERY | Admitting: SURGERY
Payer: COMMERCIAL

## 2021-09-21 VITALS — TEMPERATURE: 96.9 F | SYSTOLIC BLOOD PRESSURE: 145 MMHG | DIASTOLIC BLOOD PRESSURE: 91 MMHG | OXYGEN SATURATION: 100 %

## 2021-09-21 VITALS
OXYGEN SATURATION: 96 % | WEIGHT: 220 LBS | RESPIRATION RATE: 31 BRPM | HEIGHT: 72 IN | DIASTOLIC BLOOD PRESSURE: 98 MMHG | SYSTOLIC BLOOD PRESSURE: 123 MMHG | HEART RATE: 83 BPM | TEMPERATURE: 97.9 F | BODY MASS INDEX: 29.8 KG/M2

## 2021-09-21 DIAGNOSIS — G89.18 POST-OP PAIN: Primary | ICD-10-CM

## 2021-09-21 PROCEDURE — 2580000003 HC RX 258: Performed by: ANESTHESIOLOGY

## 2021-09-21 PROCEDURE — 3700000000 HC ANESTHESIA ATTENDED CARE: Performed by: SURGERY

## 2021-09-21 PROCEDURE — 6360000002 HC RX W HCPCS: Performed by: NURSE ANESTHETIST, CERTIFIED REGISTERED

## 2021-09-21 PROCEDURE — 2709999900 HC NON-CHARGEABLE SUPPLY: Performed by: SURGERY

## 2021-09-21 PROCEDURE — C1760 CLOSURE DEV, VASC: HCPCS | Performed by: SURGERY

## 2021-09-21 PROCEDURE — 6370000000 HC RX 637 (ALT 250 FOR IP): Performed by: ANESTHESIOLOGY

## 2021-09-21 PROCEDURE — 3700000001 HC ADD 15 MINUTES (ANESTHESIA): Performed by: SURGERY

## 2021-09-21 PROCEDURE — 7100000010 HC PHASE II RECOVERY - FIRST 15 MIN: Performed by: SURGERY

## 2021-09-21 PROCEDURE — C1781 MESH (IMPLANTABLE): HCPCS | Performed by: SURGERY

## 2021-09-21 PROCEDURE — 2580000003 HC RX 258: Performed by: SURGERY

## 2021-09-21 PROCEDURE — 7100000000 HC PACU RECOVERY - FIRST 15 MIN: Performed by: SURGERY

## 2021-09-21 PROCEDURE — 93005 ELECTROCARDIOGRAM TRACING: CPT | Performed by: ANESTHESIOLOGY

## 2021-09-21 PROCEDURE — 2500000003 HC RX 250 WO HCPCS: Performed by: NURSE ANESTHETIST, CERTIFIED REGISTERED

## 2021-09-21 PROCEDURE — 2500000003 HC RX 250 WO HCPCS: Performed by: SURGERY

## 2021-09-21 PROCEDURE — 6360000002 HC RX W HCPCS: Performed by: ANESTHESIOLOGY

## 2021-09-21 PROCEDURE — 3600000019 HC SURGERY ROBOT ADDTL 15MIN: Performed by: SURGERY

## 2021-09-21 PROCEDURE — 7100000011 HC PHASE II RECOVERY - ADDTL 15 MIN: Performed by: SURGERY

## 2021-09-21 PROCEDURE — 2720000010 HC SURG SUPPLY STERILE: Performed by: SURGERY

## 2021-09-21 PROCEDURE — 3600000009 HC SURGERY ROBOT BASE: Performed by: SURGERY

## 2021-09-21 PROCEDURE — 7100000001 HC PACU RECOVERY - ADDTL 15 MIN: Performed by: SURGERY

## 2021-09-21 PROCEDURE — S2900 ROBOTIC SURGICAL SYSTEM: HCPCS | Performed by: SURGERY

## 2021-09-21 DEVICE — MESH SURG W3.5XL6IN POLY SELF FIXATING RECT W/ RESRB PLA: Type: IMPLANTABLE DEVICE | Site: ABDOMEN | Status: FUNCTIONAL

## 2021-09-21 RX ORDER — MAGNESIUM HYDROXIDE 1200 MG/15ML
LIQUID ORAL CONTINUOUS PRN
Status: COMPLETED | OUTPATIENT
Start: 2021-09-21 | End: 2021-09-21

## 2021-09-21 RX ORDER — FENTANYL CITRATE 50 UG/ML
INJECTION, SOLUTION INTRAMUSCULAR; INTRAVENOUS PRN
Status: DISCONTINUED | OUTPATIENT
Start: 2021-09-21 | End: 2021-09-21 | Stop reason: SDUPTHER

## 2021-09-21 RX ORDER — FENTANYL CITRATE 50 UG/ML
25 INJECTION, SOLUTION INTRAMUSCULAR; INTRAVENOUS EVERY 5 MIN PRN
Status: DISCONTINUED | OUTPATIENT
Start: 2021-09-21 | End: 2021-09-21 | Stop reason: HOSPADM

## 2021-09-21 RX ORDER — DEXAMETHASONE SODIUM PHOSPHATE 10 MG/ML
INJECTION INTRAMUSCULAR; INTRAVENOUS PRN
Status: DISCONTINUED | OUTPATIENT
Start: 2021-09-21 | End: 2021-09-21 | Stop reason: SDUPTHER

## 2021-09-21 RX ORDER — MEPERIDINE HYDROCHLORIDE 50 MG/ML
12.5 INJECTION INTRAMUSCULAR; INTRAVENOUS; SUBCUTANEOUS EVERY 5 MIN PRN
Status: DISCONTINUED | OUTPATIENT
Start: 2021-09-21 | End: 2021-09-21 | Stop reason: HOSPADM

## 2021-09-21 RX ORDER — PROMETHAZINE HYDROCHLORIDE 25 MG/ML
6.25 INJECTION, SOLUTION INTRAMUSCULAR; INTRAVENOUS
Status: DISCONTINUED | OUTPATIENT
Start: 2021-09-21 | End: 2021-09-21 | Stop reason: HOSPADM

## 2021-09-21 RX ORDER — LIDOCAINE HYDROCHLORIDE 10 MG/ML
1 INJECTION, SOLUTION EPIDURAL; INFILTRATION; INTRACAUDAL; PERINEURAL
Status: DISCONTINUED | OUTPATIENT
Start: 2021-09-21 | End: 2021-09-21 | Stop reason: HOSPADM

## 2021-09-21 RX ORDER — BUPIVACAINE HYDROCHLORIDE 2.5 MG/ML
INJECTION, SOLUTION EPIDURAL; INFILTRATION; INTRACAUDAL PRN
Status: DISCONTINUED | OUTPATIENT
Start: 2021-09-21 | End: 2021-09-21 | Stop reason: ALTCHOICE

## 2021-09-21 RX ORDER — MIDAZOLAM HYDROCHLORIDE 2 MG/2ML
1 INJECTION, SOLUTION INTRAMUSCULAR; INTRAVENOUS EVERY 10 MIN PRN
Status: DISCONTINUED | OUTPATIENT
Start: 2021-09-21 | End: 2021-09-21 | Stop reason: HOSPADM

## 2021-09-21 RX ORDER — SODIUM CHLORIDE, SODIUM LACTATE, POTASSIUM CHLORIDE, CALCIUM CHLORIDE 600; 310; 30; 20 MG/100ML; MG/100ML; MG/100ML; MG/100ML
INJECTION, SOLUTION INTRAVENOUS CONTINUOUS
Status: DISCONTINUED | OUTPATIENT
Start: 2021-09-21 | End: 2021-09-21 | Stop reason: HOSPADM

## 2021-09-21 RX ORDER — OXYCODONE HYDROCHLORIDE AND ACETAMINOPHEN 5; 325 MG/1; MG/1
1 TABLET ORAL ONCE
Status: COMPLETED | OUTPATIENT
Start: 2021-09-21 | End: 2021-09-21

## 2021-09-21 RX ORDER — OXYCODONE HYDROCHLORIDE AND ACETAMINOPHEN 5; 325 MG/1; MG/1
1 TABLET ORAL EVERY 6 HOURS PRN
Qty: 18 TABLET | Refills: 0 | Status: SHIPPED | OUTPATIENT
Start: 2021-09-21 | End: 2021-09-26

## 2021-09-21 RX ORDER — HYDROCODONE BITARTRATE AND ACETAMINOPHEN 5; 325 MG/1; MG/1
1 TABLET ORAL
Status: DISCONTINUED | OUTPATIENT
Start: 2021-09-21 | End: 2021-09-21

## 2021-09-21 RX ORDER — DOCUSATE SODIUM 100 MG/1
100 CAPSULE, LIQUID FILLED ORAL 2 TIMES DAILY
Qty: 20 CAPSULE | Refills: 0 | Status: SHIPPED | OUTPATIENT
Start: 2021-09-21 | End: 2021-10-21

## 2021-09-21 RX ORDER — MIDAZOLAM HYDROCHLORIDE 1 MG/ML
INJECTION INTRAMUSCULAR; INTRAVENOUS PRN
Status: DISCONTINUED | OUTPATIENT
Start: 2021-09-21 | End: 2021-09-21 | Stop reason: SDUPTHER

## 2021-09-21 RX ORDER — ONDANSETRON 4 MG/1
4 TABLET, FILM COATED ORAL EVERY 8 HOURS PRN
Qty: 20 TABLET | Refills: 0 | Status: SHIPPED | OUTPATIENT
Start: 2021-09-21

## 2021-09-21 RX ORDER — SCOLOPAMINE TRANSDERMAL SYSTEM 1 MG/1
1 PATCH, EXTENDED RELEASE TRANSDERMAL ONCE
Status: DISCONTINUED | OUTPATIENT
Start: 2021-09-21 | End: 2021-09-21 | Stop reason: HOSPADM

## 2021-09-21 RX ORDER — CYCLOBENZAPRINE HCL 10 MG
10 TABLET ORAL 3 TIMES DAILY PRN
Qty: 30 TABLET | Refills: 0 | Status: SHIPPED | OUTPATIENT
Start: 2021-09-21 | End: 2021-10-01

## 2021-09-21 RX ORDER — MEPERIDINE HYDROCHLORIDE 50 MG/ML
12.5 INJECTION INTRAMUSCULAR; INTRAVENOUS; SUBCUTANEOUS EVERY 5 MIN PRN
Status: DISCONTINUED | OUTPATIENT
Start: 2021-09-21 | End: 2021-09-21

## 2021-09-21 RX ORDER — ONDANSETRON 2 MG/ML
4 INJECTION INTRAMUSCULAR; INTRAVENOUS DAILY PRN
Status: DISCONTINUED | OUTPATIENT
Start: 2021-09-21 | End: 2021-09-21 | Stop reason: HOSPADM

## 2021-09-21 RX ORDER — DIPHENHYDRAMINE HYDROCHLORIDE 50 MG/ML
12.5 INJECTION INTRAMUSCULAR; INTRAVENOUS
Status: DISCONTINUED | OUTPATIENT
Start: 2021-09-21 | End: 2021-09-21 | Stop reason: HOSPADM

## 2021-09-21 RX ORDER — PHENYLEPHRINE HCL IN 0.9% NACL 1 MG/10 ML
SYRINGE (ML) INTRAVENOUS PRN
Status: DISCONTINUED | OUTPATIENT
Start: 2021-09-21 | End: 2021-09-21 | Stop reason: SDUPTHER

## 2021-09-21 RX ORDER — METOCLOPRAMIDE HYDROCHLORIDE 5 MG/ML
10 INJECTION INTRAMUSCULAR; INTRAVENOUS
Status: DISCONTINUED | OUTPATIENT
Start: 2021-09-21 | End: 2021-09-21 | Stop reason: HOSPADM

## 2021-09-21 RX ORDER — PROPOFOL 10 MG/ML
INJECTION, EMULSION INTRAVENOUS PRN
Status: DISCONTINUED | OUTPATIENT
Start: 2021-09-21 | End: 2021-09-21 | Stop reason: SDUPTHER

## 2021-09-21 RX ORDER — HYDRALAZINE HYDROCHLORIDE 20 MG/ML
5 INJECTION INTRAMUSCULAR; INTRAVENOUS EVERY 10 MIN PRN
Status: DISCONTINUED | OUTPATIENT
Start: 2021-09-21 | End: 2021-09-21 | Stop reason: HOSPADM

## 2021-09-21 RX ORDER — GLYCOPYRROLATE 1 MG/5 ML
SYRINGE (ML) INTRAVENOUS PRN
Status: DISCONTINUED | OUTPATIENT
Start: 2021-09-21 | End: 2021-09-21 | Stop reason: SDUPTHER

## 2021-09-21 RX ORDER — ROCURONIUM BROMIDE 10 MG/ML
INJECTION, SOLUTION INTRAVENOUS PRN
Status: DISCONTINUED | OUTPATIENT
Start: 2021-09-21 | End: 2021-09-21 | Stop reason: SDUPTHER

## 2021-09-21 RX ORDER — FENTANYL CITRATE 50 UG/ML
50 INJECTION, SOLUTION INTRAMUSCULAR; INTRAVENOUS EVERY 5 MIN PRN
Status: COMPLETED | OUTPATIENT
Start: 2021-09-21 | End: 2021-09-21

## 2021-09-21 RX ORDER — ONDANSETRON 2 MG/ML
INJECTION INTRAMUSCULAR; INTRAVENOUS PRN
Status: DISCONTINUED | OUTPATIENT
Start: 2021-09-21 | End: 2021-09-21 | Stop reason: SDUPTHER

## 2021-09-21 RX ORDER — NEOSTIGMINE METHYLSULFATE 5 MG/5 ML
SYRINGE (ML) INTRAVENOUS PRN
Status: DISCONTINUED | OUTPATIENT
Start: 2021-09-21 | End: 2021-09-21 | Stop reason: SDUPTHER

## 2021-09-21 RX ORDER — LIDOCAINE HYDROCHLORIDE 10 MG/ML
INJECTION, SOLUTION EPIDURAL; INFILTRATION; INTRACAUDAL; PERINEURAL PRN
Status: DISCONTINUED | OUTPATIENT
Start: 2021-09-21 | End: 2021-09-21 | Stop reason: SDUPTHER

## 2021-09-21 RX ADMIN — DEXAMETHASONE SODIUM PHOSPHATE 10 MG: 10 INJECTION INTRAMUSCULAR; INTRAVENOUS at 16:06

## 2021-09-21 RX ADMIN — FENTANYL CITRATE 100 MCG: 50 INJECTION, SOLUTION INTRAMUSCULAR; INTRAVENOUS at 15:53

## 2021-09-21 RX ADMIN — ROCURONIUM BROMIDE 20 MG: 10 INJECTION INTRAVENOUS at 16:10

## 2021-09-21 RX ADMIN — SODIUM CHLORIDE, POTASSIUM CHLORIDE, SODIUM LACTATE AND CALCIUM CHLORIDE: 600; 310; 30; 20 INJECTION, SOLUTION INTRAVENOUS at 12:09

## 2021-09-21 RX ADMIN — Medication 0.2 MG: at 16:38

## 2021-09-21 RX ADMIN — ONDANSETRON 4 MG: 2 INJECTION, SOLUTION INTRAMUSCULAR; INTRAVENOUS at 17:29

## 2021-09-21 RX ADMIN — FENTANYL CITRATE 50 MCG: 50 INJECTION, SOLUTION INTRAMUSCULAR; INTRAVENOUS at 18:25

## 2021-09-21 RX ADMIN — ROCURONIUM BROMIDE 50 MG: 10 INJECTION INTRAVENOUS at 15:53

## 2021-09-21 RX ADMIN — Medication 3 MG: at 17:34

## 2021-09-21 RX ADMIN — FENTANYL CITRATE 50 MCG: 50 INJECTION, SOLUTION INTRAMUSCULAR; INTRAVENOUS at 18:02

## 2021-09-21 RX ADMIN — Medication 0.2 MG: at 16:23

## 2021-09-21 RX ADMIN — Medication 100 MCG: at 16:39

## 2021-09-21 RX ADMIN — PROPOFOL 200 MG: 10 INJECTION, EMULSION INTRAVENOUS at 15:53

## 2021-09-21 RX ADMIN — Medication 0.4 MG: at 17:46

## 2021-09-21 RX ADMIN — FENTANYL CITRATE 50 MCG: 50 INJECTION, SOLUTION INTRAMUSCULAR; INTRAVENOUS at 18:20

## 2021-09-21 RX ADMIN — ROCURONIUM BROMIDE 10 MG: 10 INJECTION INTRAVENOUS at 16:53

## 2021-09-21 RX ADMIN — Medication 2 MG: at 17:46

## 2021-09-21 RX ADMIN — FENTANYL CITRATE 25 MCG: 50 INJECTION, SOLUTION INTRAMUSCULAR; INTRAVENOUS at 18:56

## 2021-09-21 RX ADMIN — MIDAZOLAM HYDROCHLORIDE 2 MG: 1 INJECTION, SOLUTION INTRAMUSCULAR; INTRAVENOUS at 15:49

## 2021-09-21 RX ADMIN — Medication 0.6 MG: at 17:34

## 2021-09-21 RX ADMIN — Medication 100 MCG: at 16:58

## 2021-09-21 RX ADMIN — SODIUM CHLORIDE, POTASSIUM CHLORIDE, SODIUM LACTATE AND CALCIUM CHLORIDE: 600; 310; 30; 20 INJECTION, SOLUTION INTRAVENOUS at 16:21

## 2021-09-21 RX ADMIN — OXYCODONE HYDROCHLORIDE AND ACETAMINOPHEN 1 TABLET: 5; 325 TABLET ORAL at 18:52

## 2021-09-21 RX ADMIN — FENTANYL CITRATE 50 MCG: 50 INJECTION, SOLUTION INTRAMUSCULAR; INTRAVENOUS at 18:07

## 2021-09-21 RX ADMIN — LIDOCAINE HYDROCHLORIDE 50 MG: 10 INJECTION, SOLUTION EPIDURAL; INFILTRATION; INTRACAUDAL; PERINEURAL at 15:53

## 2021-09-21 RX ADMIN — CEFAZOLIN SODIUM 2000 MG: 10 INJECTION, POWDER, FOR SOLUTION INTRAVENOUS at 15:59

## 2021-09-21 ASSESSMENT — PAIN SCALES - GENERAL
PAINLEVEL_OUTOF10: 10
PAINLEVEL_OUTOF10: 10
PAINLEVEL_OUTOF10: 8
PAINLEVEL_OUTOF10: 10
PAINLEVEL_OUTOF10: 8
PAINLEVEL_OUTOF10: 10
PAINLEVEL_OUTOF10: 8

## 2021-09-21 ASSESSMENT — PULMONARY FUNCTION TESTS
PIF_VALUE: 6
PIF_VALUE: 20
PIF_VALUE: 28
PIF_VALUE: 29
PIF_VALUE: 26
PIF_VALUE: 25
PIF_VALUE: 18
PIF_VALUE: 16
PIF_VALUE: 20
PIF_VALUE: 25
PIF_VALUE: 28
PIF_VALUE: 5
PIF_VALUE: 28
PIF_VALUE: 17
PIF_VALUE: 0
PIF_VALUE: 27
PIF_VALUE: 2
PIF_VALUE: 27
PIF_VALUE: 26
PIF_VALUE: 26
PIF_VALUE: 28
PIF_VALUE: 2
PIF_VALUE: 27
PIF_VALUE: 25
PIF_VALUE: 29
PIF_VALUE: 17
PIF_VALUE: 25
PIF_VALUE: 2
PIF_VALUE: 28
PIF_VALUE: 26
PIF_VALUE: 19
PIF_VALUE: 25
PIF_VALUE: 8
PIF_VALUE: 19
PIF_VALUE: 25
PIF_VALUE: 26
PIF_VALUE: 21
PIF_VALUE: 26
PIF_VALUE: 25
PIF_VALUE: 27
PIF_VALUE: 28
PIF_VALUE: 6
PIF_VALUE: 28
PIF_VALUE: 16
PIF_VALUE: 25
PIF_VALUE: 27
PIF_VALUE: 27
PIF_VALUE: 16
PIF_VALUE: 0
PIF_VALUE: 17
PIF_VALUE: 17
PIF_VALUE: 25
PIF_VALUE: 4
PIF_VALUE: 17
PIF_VALUE: 16
PIF_VALUE: 27
PIF_VALUE: 17
PIF_VALUE: 25
PIF_VALUE: 18
PIF_VALUE: 25
PIF_VALUE: 20
PIF_VALUE: 22
PIF_VALUE: 25
PIF_VALUE: 0
PIF_VALUE: 27
PIF_VALUE: 16
PIF_VALUE: 28
PIF_VALUE: 21
PIF_VALUE: 24
PIF_VALUE: 18
PIF_VALUE: 26
PIF_VALUE: 27
PIF_VALUE: 16
PIF_VALUE: 28
PIF_VALUE: 27
PIF_VALUE: 19
PIF_VALUE: 25
PIF_VALUE: 17
PIF_VALUE: 28
PIF_VALUE: 27
PIF_VALUE: 28
PIF_VALUE: 27
PIF_VALUE: 26
PIF_VALUE: 17
PIF_VALUE: 27
PIF_VALUE: 16
PIF_VALUE: 27
PIF_VALUE: 5
PIF_VALUE: 24
PIF_VALUE: 29
PIF_VALUE: 29
PIF_VALUE: 27
PIF_VALUE: 16
PIF_VALUE: 26
PIF_VALUE: 22
PIF_VALUE: 18
PIF_VALUE: 28
PIF_VALUE: 28
PIF_VALUE: 18
PIF_VALUE: 3
PIF_VALUE: 25
PIF_VALUE: 29
PIF_VALUE: 1
PIF_VALUE: 18
PIF_VALUE: 28
PIF_VALUE: 27
PIF_VALUE: 18
PIF_VALUE: 0
PIF_VALUE: 23
PIF_VALUE: 21
PIF_VALUE: 18
PIF_VALUE: 22
PIF_VALUE: 27
PIF_VALUE: 28
PIF_VALUE: 25
PIF_VALUE: 27

## 2021-09-21 ASSESSMENT — PAIN DESCRIPTION - LOCATION: LOCATION: ABDOMEN

## 2021-09-21 ASSESSMENT — PAIN DESCRIPTION - PAIN TYPE: TYPE: SURGICAL PAIN

## 2021-09-21 ASSESSMENT — PAIN - FUNCTIONAL ASSESSMENT: PAIN_FUNCTIONAL_ASSESSMENT: 0-10

## 2021-09-21 NOTE — ANESTHESIA PRE PROCEDURE
Department of Anesthesiology  Preprocedure Note       Name:  Rolan Lynch   Age:  60 y.o.  :  1965                                          MRN:  [id number]         Date:  2021      Surgeon: Forrest Morel):  Yin Evans IV, DO    Procedure: Procedure(s):  XI ROBOTIC INGUINAL HERNIA REPAIR WITH MESH    Medications prior to admission:   Prior to Admission medications    Medication Sig Start Date End Date Taking? Authorizing Provider   oxyCODONE-acetaminophen (PERCOCET) 5-325 MG per tablet Take 1-2 tablets by mouth every 6 hours as needed for Pain for up to 4 doses. WARNING:  May cause drowsiness. May impair ability to operate vehicles or machinery. Do not use in combination with alcohol. 21 Yes Qi Granado MD   VITAMIN D PO Take 1 tablet by mouth daily Confirm strength with pt   Yes Historical Provider, MD   tiZANidine (ZANAFLEX) 4 MG tablet Take 1 tablet by mouth 3 times daily as needed (Muscle spasm) 21  Yes Trinity Quinones MD       Current medications:    No current facility-administered medications for this encounter. Allergies:  No Known Allergies    Problem List:    Patient Active Problem List   Diagnosis Code    Left inguinal hernia K40.90       Past Medical History:        Diagnosis Date    Arthritis     COVID-19 2020    pt reports having had covid, symptoms x 1-2  months, never hospitalized    Hyperlipidemia     no meds    Left groin pain 2021    acute, lifting something heavy at work, [facility] 's ER    Wellness examination     PCP, Dr. Delores Patterson, last visit 21 , [facility]       Past Surgical History:        Procedure Laterality Date    HERNIA REPAIR Right     inguinal    SHOULDER SURGERY Right 2020    Dr. Trinity Rodriguez, [address]       Social History:    Social History     Tobacco Use    Smoking status: Never Smoker    Smokeless tobacco: Never Used   Substance Use Topics    Alcohol use:  Yes     Alcohol/week: 3.0 standard drinks Types: 3 Cans of beer per week     Comment: 3-4 times per week                                Counseling given: Not Answered      Vital Signs (Current):   Vitals:    09/20/21 1353   Weight: 220 lb (99.8 kg)   Height: 6' (1.829 m)                                              BP Readings from Last 3 Encounters:   09/20/21 (!) 146/107   05/11/21 (!) 141/81   03/19/20 126/79       NPO Status:                                                                                 BMI:   Wt Readings from Last 3 Encounters:   09/20/21 220 lb (99.8 kg)   09/20/21 220 lb (99.8 kg)   03/19/20 225 lb (102.1 kg)     Body mass index is 29.84 kg/m². CBC:   Lab Results   Component Value Date    WBC 3.6 09/20/2021    RBC 5.26 09/20/2021    RBC 4.67 11/16/2011    HGB 15.0 09/20/2021    HCT 47.5 09/20/2021    MCV 90.3 09/20/2021    RDW 12.8 09/20/2021     09/20/2021     11/16/2011       CMP:   Lab Results   Component Value Date     09/20/2021    K 4.0 09/20/2021     09/20/2021    CO2 24 09/20/2021    BUN 14 09/20/2021    CREATININE 0.86 09/20/2021    GFRAA >60 09/20/2021    LABGLOM >60 09/20/2021    GLUCOSE 147 09/20/2021    PROT 7.3 09/20/2021    CALCIUM 9.5 09/20/2021    BILITOT 0.88 09/20/2021    ALKPHOS 54 09/20/2021    AST 25 09/20/2021    ALT 25 09/20/2021       POC Tests: No results for input(s): POCGLU, POCNA, POCK, POCCL, POCBUN, POCHEMO, POCHCT in the last 72 hours.     Coags:   Lab Results   Component Value Date    PROTIME 10.3 09/20/2021    INR 1.0 09/20/2021    APTT 24.4 09/20/2021       HCG (If Applicable): No results found for: PREGTESTUR, PREGSERUM, HCG, HCGQUANT     ABGs: No results found for: PHART, PO2ART, VYV0OIA, HDH4NWN, BEART, Z2RAKMYT     Type & Screen (If Applicable):  No results found for: LABABO, LABRH    Drug/Infectious Status (If Applicable):  No results found for: HIV, HEPCAB    COVID-19 Screening (If Applicable):   Lab Results   Component Value Date    COVID19 Not Detected 09/20/2021 Anesthesia Evaluation  Patient summary reviewed no history of anesthetic complications:   Airway: Mallampati: II        Dental:          Pulmonary:normal exam  breath sounds clear to auscultation                            ROS comment: COVID-19 pt reports having had covid, symptoms x 1-2  months, never hospitalized    Cardiovascular:Negative CV ROS  Exercise tolerance: good (>4 METS),           Rhythm: regular  Rate: normal                    Neuro/Psych:   Negative Neuro/Psych ROS              GI/Hepatic/Renal: Neg GI/Hepatic/Renal ROS            Endo/Other: Negative Endo/Other ROS                    Abdominal:             Vascular: negative vascular ROS. Other Findings:             Anesthesia Plan      general     ASA 2       Induction: intravenous. MIPS: Postoperative opioids intended, Prophylactic antiemetics administered and Postoperative trial extubation. Anesthetic plan and risks discussed with patient. Plan discussed with CRNA. Narrative & Impression    Normal sinus rhythm  Normal ECG  When compared with ECG of 09-JUN-2016 14:41,  Vent.  rate has increased BY  34 BPM      Specimen Collected: 03/19/20 05:43                Elian Condon MD   9/21/2021

## 2021-09-21 NOTE — ANESTHESIA POSTPROCEDURE EVALUATION
Department of Anesthesiology  Postprocedure Note    Patient: Antwon Conti  MRN: [id number]  YOB: 1965  Date of evaluation: 9/21/2021  Time:  7:11 PM     Procedure Summary     Date: 09/21/21 Room / Location: [address]    Anesthesia Start: 9690 Anesthesia Stop: 5723    Procedure: XI ROBOTIC INGUINAL HERNIA REPAIR WITH MESH (Left Abdomen) Diagnosis: (LEFT INGUINAL HERNIA)    Surgeons: Shaila Evans IV, DO Responsible Provider: Violeta Ortiz MD    Anesthesia Type: general ASA Status: 2          Anesthesia Type: general    Colton Phase I: Colton Score: 10    Colton Phase II: Colton Score: 10    Last vitals: Reviewed and per EMR flowsheets.        Anesthesia Post Evaluation    Patient location during evaluation: PACU  Patient participation: complete - patient participated  Level of consciousness: awake and alert  Pain score: 3  Airway patency: patent  Nausea & Vomiting: no vomiting and no nausea  Complications: no  Cardiovascular status: hemodynamically stable  Respiratory status: acceptable  Hydration status: stable

## 2021-09-21 NOTE — H&P
H&P  General Surgery        Pt Name: Brianna Cadet  MRN: [id number]  YOB: 1965  Date of evaluation: 9/21/2021      [x] I have examined the patient and reviewed the H&P/Consult and there are no changes to the patient or plans. [] I have examined the patient and reviewed the H&P/Consult and have noted the following changes:     I have included the previous office H&P below:    OR today for left inguinal hernia repair, consent in chart, pt marked. COVID neg    Electronically signed by Machelle Ríos DO on 9/21/2021 at 3:14 PM      General Surgery:  H&P         PATIENT NAME: Shayna Range OF BIRTH: 1965     ADMISSION DATE: 9/20/2021  9:25 AM      Admitting Provider: Sebas Aleman     Consulted Physician: Dr. Ramonia Severance DATE: 9/20/2021     Chief Complaint:  Abdominal pain  Consult Regarding:  Left inguinal hernia repair     HISTORY OF PRESENT ILLNESS:  The patient is a 60 y.o. male who presented to the ED on September 20 complaining of abdominal pain. He was at work and lifted something heavy when he felt a pop in his left groin. Has had abdominal pain that radiates to his back. It has improved with pain medication since being in the ER. He denies any change in bowel habits, no hematochezia, no melena. Denies nausea, vomiting, chest pain, shortness of breath. He reports he is able to push the bulge back in however it pops out right away, especially when standing up. This is never happened on this side before, however he has had a right inguinal hernia repair in the past.  He does not remember who did the repair.      He has a past surgical history of right inguinal hernia repair, right shoulder repair. No medical history. He denies diabetes, heart disease, lung disease. He does not take any medications. He does not smoke.   He drinks 2-3 beers a few times a week.     Past Medical History:    Past Medical History    No past medical history on file.        Past Surgical   LABGLOM >60 09/20/2021     GLUCOSE 147 09/20/2021         Pertinent Radiology:   No results found.        ASSESSMENT:       Active Hospital Problems     Diagnosis Date Noted    Left inguinal hernia [K40.90] 09/20/2021         49-year-old male with reducible left inguinal hernia.     Plan:  1. Discharge per ED. We will plan on doing an elective repair tomorrow morning of the left inguinal hernia. Plan is robotic assisted laparoscopic inguinal hernia repair with mesh. All risks and benefits were explained to the patient and all questions were answered. He will most likely be able to go home same day.   2. Rapid Covid test in the ER.        Electronically signed by Avis Jose DO  on 9/20/2021 at 12:39 PM

## 2021-09-21 NOTE — LETTER
[facility]  [facility]  Phone: [phone]    No name on file. September 21, 2021     Patient: Homar Bolanos   YOB: 1965   Date of Visit: 9/21/2021       To Whom It May Concern: It is my medical opinion that Koko Ugarte underwent surgery on 9/21/21. He is to not perform any heavy lifting for the next 6 weeks. Please excuse him from work until 9/30/2021. If you have any questions or concerns, please don't hesitate to call. Sincerely,        Dr. Alicia Henley.  Evelyn Herrera

## 2021-09-21 NOTE — OP NOTE
Operative Note      Patient: Jim Wilkerson  YOB: 1965  MRN: [id number]    Date of Procedure: 9/21/2021    Pre-Op Diagnosis: LEFT INGUINAL HERNIA    Post-Op Diagnosis: LEFT INGUINAL HERNIA        Procedure(s):  XI ROBOTIC INGUINAL HERNIA REPAIR WITH MESH    Surgeon(s):  Mandie Solitario IV, DO    Assistant:   First Assistant: Herve Otoole  Resident: Madelin Delacruz DO    Anesthesia: General    Estimated Blood Loss (mL): 9 mL    Complications: None    Specimens:   * No specimens in log *    Implants:  Implant Name Type Inv. Item Serial No.  Lot No. LRB No. Used Action   MESH SURG W3.5XL6IN POLY SELF FIXATING RECT W/ RESRB ILEANA  MESH SURG W3.5XL6IN POLY SELF FIXATING RECT W/ RESRB ILEANA  [employer]  SURGICAL-WD [id number] Left 1 Implanted         Drains:   [REMOVED] Urethral Catheter Non-latex 16 fr (Removed)       Findings: Left inguinal hernia with large indirect defect, small direct defect, repaired with pro  mesh    Detailed Description of Procedure:   Patient taken to operative suite. Placed in supine position. General anesthesia with endotracheal tube was induced. Preoperative antibiotics were given in accordance with SCIP protocol. SCDs placed for DVT prophylaxis. A bear hugger was placed to keep patient euthermic. Abdomen and groin region was shaved with clippers and prepped and draped in usual sterile fashion. An appropriate time out was performed prior to skin incision. Verese needle was placed in the left upper quadrant at Burger's point. A saline drop test was used to confirm entrance into the abdomen. Pneumoperitoneum was created with insufflation of carbon dioxide to 15 mmHg. An 8 mm Airseal port was placed in the left upper quadrant using optiview. Remaining ports ports under direct visualization. An 8 mm camera port was placed supraumbilical.  The two other 8mm ports were placed 10cm right and left lateral to the umbilical camera port. Robot was docked without complication. A 0 degree scope was placed into the abdomen. Both inguinal regions were inspected and the median umbilical ligament, medial umbilical ligaments, and lateral umbilical folds were identified. A left indirect hernia and direct hernia were noted. There was no right inguinal hernia on inspection. The peritoneum on the left side was incised with scissor electrocautery along a line 2 cm above the superior edge of the hernia defect, extending from the medial umbilical ligament to the anterior superior iliac spine. The peritoneal flap was mobilized inferiorly using blunt dissection. Blunt dissection was down from the ASIS to the lateral edge of the internal ring. The inferior epigastric vessels were exposed and kept superior to the dissection planes at all times during the operation. Medial dissection was done until Brendon's ligament was exposed. The cord structures and hernia sac were identified. Cord structures were preserved during the dissection and reduction of hernia sac. The cord structures were dissected free from the hernia sac circumferentially. The hernia sac containing fat was reduced. Hemostasis was maintained. Once dissection was completed, a 15 cm x 9 cm piece progrip mesh was rolled double scroll and placed within the peritoneum flap. The mesh was centered over the deep inguinal ring and unrolled to cover direct and indirect hernia spaces. It was noted to lay flat, in good position and without crimpage. The peritoneum was closed with running 2-0 V lock suture. Robot was undocked without complications. B/l TAP block was done using a total of 50 mL of 0/25% Marcaine with epi. The 8mm robotic ports were removed under direct visualization. No bleeding was noted. The pneumoperitoneum was evacuated through the Airseal port. Airseal port was removed. Skin incisions were closed with subcuticular 4-0 Monocryl suture.  Dermabond was applied over the incisions. Patient tolerated the procedure well. There were no complications. All instruments and sponges were accounted for. Patient was extubated and taken to recovery room in stable condition. Dr. Lauren Beatty was in the operating room for the entirety of case.       Electronically signed by Duane Chary, DO on 9/21/2021 at 5:52 PM

## 2021-09-22 LAB
EKG ATRIAL RATE: 43 BPM
EKG P AXIS: 25 DEGREES
EKG P-R INTERVAL: 168 MS
EKG Q-T INTERVAL: 470 MS
EKG QRS DURATION: 84 MS
EKG QTC CALCULATION (BAZETT): 397 MS
EKG R AXIS: 10 DEGREES
EKG T AXIS: 19 DEGREES
EKG VENTRICULAR RATE: 43 BPM

## 2021-09-22 PROCEDURE — 93010 ELECTROCARDIOGRAM REPORT: CPT | Performed by: INTERNAL MEDICINE

## 2021-09-29 ENCOUNTER — OFFICE VISIT (OUTPATIENT)
Dept: SURGERY | Age: 56
End: 2021-09-29
Payer: COMMERCIAL

## 2021-09-29 VITALS
HEART RATE: 71 BPM | WEIGHT: 209.8 LBS | DIASTOLIC BLOOD PRESSURE: 81 MMHG | BODY MASS INDEX: 28.45 KG/M2 | SYSTOLIC BLOOD PRESSURE: 112 MMHG

## 2021-09-29 DIAGNOSIS — Z48.89 ENCOUNTER FOR POSTOPERATIVE CARE: Primary | ICD-10-CM

## 2021-09-29 PROCEDURE — 99024 POSTOP FOLLOW-UP VISIT: CPT

## 2021-09-29 NOTE — LETTER
[facility]  [facility] [address]  Phone: [phone]  Fax: [phone]    Antonio Vásquez DO        September 29, 2021     Patient: Edgar Saenz   YOB: 1965   Date of Visit: 9/29/2021       To Whom It May Concern: It is my medical opinion that Germania Read may return to work on 11/08/21 without restriction. If you have any questions or concerns, please don't hesitate to call.     Sincerely,        Antonio Vásquez DO

## 2021-09-29 NOTE — PROGRESS NOTES
Visit Information    Have you changed or started any medications since your last visit including any over-the-counter medicines, vitamins, or herbal medicines? no   Are you having any side effects from any of your medications? -  no  Have you stopped taking any of your medications? Is so, why? -  no    Have you seen any other physician or provider since your last visit? No  Have you had any other diagnostic tests since your last visit? No  Have you been seen in the emergency room and/or had an admission to a hospital since we last saw you? No  Have you had your routine dental cleaning in the past 6 months? no    Have you activated your [employer] account? If not, what are your barriers?  No:      Patient Care Team:  Nathan Pham MD as PCP - General (Family Medicine)    Medical History Review  Past Medical, Family, and Social History reviewed and does not contribute to the patient presenting condition    Health Maintenance   Topic Date Due    Hepatitis C screen  Never done    COVID-19 Vaccine (1) Never done    HIV screen  Never done    Diabetes screen  Never done    Colon cancer screen colonoscopy  Never done    Shingles Vaccine (1 of 2) Never done    Lipid screen  05/15/2018    Flu vaccine (1) Never done    DTaP/Tdap/Td vaccine (3 - Td or Tdap) 10/12/2027    Hepatitis A vaccine  Aged Out    Hepatitis B vaccine  Aged Out    Hib vaccine  Aged Out    Meningococcal (ACWY) vaccine  Aged Out    Pneumococcal 0-64 years Vaccine  Aged Out

## 2021-11-03 ENCOUNTER — OFFICE VISIT (OUTPATIENT)
Dept: SURGERY | Age: 56
End: 2021-11-03
Payer: COMMERCIAL

## 2021-11-03 VITALS
DIASTOLIC BLOOD PRESSURE: 83 MMHG | HEIGHT: 72 IN | WEIGHT: 214.4 LBS | HEART RATE: 86 BPM | BODY MASS INDEX: 29.04 KG/M2 | SYSTOLIC BLOOD PRESSURE: 118 MMHG | TEMPERATURE: 97.7 F

## 2021-11-03 DIAGNOSIS — Z48.89 ENCOUNTER FOR POSTOPERATIVE CARE: ICD-10-CM

## 2021-11-03 DIAGNOSIS — R10.32 LEFT GROIN PAIN: Primary | ICD-10-CM

## 2021-11-03 PROCEDURE — 99024 POSTOP FOLLOW-UP VISIT: CPT | Performed by: STUDENT IN AN ORGANIZED HEALTH CARE EDUCATION/TRAINING PROGRAM

## 2021-11-03 RX ORDER — POLYETHYLENE GLYCOL 3350 17 G/17G
17 POWDER, FOR SOLUTION ORAL DAILY
Qty: 527 G | Refills: 1 | Status: SHIPPED | OUTPATIENT
Start: 2021-11-03 | End: 2021-12-03

## 2021-11-03 NOTE — LETTER
[facility]  [facility] [address]  Phone: [phone]  Fax: [phone]     Andi Lemus DO       November 3, 2021     Patient: Iris Llanos   YOB: 1965   Date of Visit: 11/3/2021       To Whom It May Concern: It is my medical opinion that Farnaz Handley remain on light duty starting 11/8/2021. He is to return to our office on 11/11/2021 for re-evaluation. If you have any questions or concerns, please don't hesitate to call.     Sincerely,         Andi Lemus DO on 11/3/2021 at 11:09 AM

## 2021-11-03 NOTE — PROGRESS NOTES
[facility]    Patient's Name/Date of Birth: Yi Chowdhury / 1965 (60 y.o.)    Subjective:  Yi Chowdhury is a 60 y.o. male that presents to the [facility] for post operative evaluation after laparoscopic inguinal hernia repair on 9/21/21. He complains of left sided groin pain and left scrotal pain that has been there since the time of surgery and is about the same. The groin pain is sharp, not associated with nausea and vomiting and complains of constipation. He also complains of small nodule in his left inguinal area. Scrotal pain is not associated with burning or increased frequency. He is tolerating diet well, and ambulating. Objective:  Vitals:    11/03/21 1033   BP: 118/83   Pulse: 86   Temp: 97.7 °F (36.5 °C)     General:Appears healthy. Alert; in no acute distress. Pleasant. Heart: normal S1 and S2  Lungs:  CTA b/l. Abdomen: soft  Skin: The incision(s) are clean, dry, intact  Extremities: No edema present. General:A & O x3  HEENT:  NCAT, PERRL, EMOI, oral mucus membrane pink and moist, no mass palpated on neck exam  Heart: S1S2, no mumurs, RRR  Lungs: symmetric chest rise and fall, no increased work of breathing  Abdomen: soft, nontender, no HSM, no guarding, no rebound, no masses, abdominal incisions healing well with skin glue in place, no erythema or discharge, hernia site , no overlying skin changes, no hernia recurrence  SKIN: Skin color, texture, turgor normal. No rashes or lesions. Nodular swelling 2x2cm in left inguinal area, mobile and tender upon palpation  Neuro: CN II-XII grossly intact. No motor or sensory deficits appreciated. MK:normal throughout upper and lower extremities    Assessment/Plan:  Patient Active Problem List   Diagnosis    Left inguinal hernia     Yi Chowdhury presents to the [facility] status post laparoscopic inguinal hernial repair. Plan   1. Tylenol regimen for pain control  2.  Miralax for constipation  3. Return to clinic with further concerns as needed    We will follow up with Belem Lofton in     Patient's case was discussed with Dr. River Martin MD   11/3/2021 11:03 AM    Attending Physician Statement  I have discussed the case with resident, including pertinent history and exam findings with the resident. I have seen and examined the patient and the key elements of the encounter have been performed by me. I agree with the assessment, plan and orders as documented by the resident.       Electronically signed by Selina Price DO  on 11/3/2021 at 12:03 PM

## 2021-11-03 NOTE — PATIENT INSTRUCTIONS
Thank you letting us take care of you today. We hope that all your questions were addressed. If a question was overlooked or something else comes to mind after you return home, please call our office at [phone]. If you need to cancel or change an appointment, surgery or procedure, please contact the office at [phone].

## 2021-11-08 ENCOUNTER — TELEPHONE (OUTPATIENT)
Dept: FAMILY MEDICINE CLINIC | Age: 56
End: 2021-11-08

## 2021-11-10 ENCOUNTER — HOSPITAL ENCOUNTER (OUTPATIENT)
Dept: ULTRASOUND IMAGING | Age: 56
Discharge: HOME | End: 2021-11-12
Payer: COMMERCIAL

## 2021-11-10 DIAGNOSIS — R10.32 LEFT GROIN PAIN: ICD-10-CM

## 2021-11-10 PROCEDURE — 76705 ECHO EXAM OF ABDOMEN: CPT

## 2021-11-17 ENCOUNTER — OFFICE VISIT (OUTPATIENT)
Dept: SURGERY | Age: 56
End: 2021-11-17

## 2021-11-17 VITALS
WEIGHT: 218.6 LBS | HEART RATE: 63 BPM | DIASTOLIC BLOOD PRESSURE: 86 MMHG | SYSTOLIC BLOOD PRESSURE: 123 MMHG | BODY MASS INDEX: 29.64 KG/M2

## 2021-11-17 DIAGNOSIS — Z09 POSTOP CHECK: Primary | ICD-10-CM

## 2021-11-17 PROCEDURE — 99024 POSTOP FOLLOW-UP VISIT: CPT | Performed by: STUDENT IN AN ORGANIZED HEALTH CARE EDUCATION/TRAINING PROGRAM

## 2021-11-17 NOTE — PROGRESS NOTES
Visit Information    Have you changed or started any medications since your last visit including any over-the-counter medicines, vitamins, or herbal medicines? no   Are you having any side effects from any of your medications? -  no  Have you stopped taking any of your medications? Is so, why? -  no    Have you seen any other physician or provider since your last visit? No  Have you had any other diagnostic tests since your last visit? No  Have you been seen in the emergency room and/or had an admission to a hospital since we last saw you? No  Have you had your routine dental cleaning in the past 6 months? no    Have you activated your [employer] account? If not, what are your barriers?  No:      Patient Care Team:  Zelalem Brown MD as PCP - General (Family Medicine)    Medical History Review  Past Medical, Family, and Social History reviewed and does not contribute to the patient presenting condition    Health Maintenance   Topic Date Due    Hepatitis C screen  Never done    COVID-19 Vaccine (1) Never done    HIV screen  Never done    Diabetes screen  Never done    Colon cancer screen colonoscopy  Never done    Shingles Vaccine (1 of 2) Never done    Lipid screen  05/15/2018    Flu vaccine (1) Never done    DTaP/Tdap/Td vaccine (3 - Td or Tdap) 10/12/2027    Hepatitis A vaccine  Aged Out    Hepatitis B vaccine  Aged Out    Hib vaccine  Aged Out    Meningococcal (ACWY) vaccine  Aged Out    Pneumococcal 0-64 years Vaccine  Aged Out

## 2021-11-17 NOTE — LETTER
[address]  [facility] [address]  Phone: [phone]  Fax: [phone]    Gilda Hoang DO        November 17, 2021     Patient: Ирина Gillespie   YOB: 1965   Date of Visit: 11/17/2021       To Whom It May Concern: It is my medical opinion that Thiago Garza may return to full duty immediately with no restrictions. On 11/21/2021. If you have any questions or concerns, please don't hesitate to call.     Sincerely,        Gilda Hoang DO

## 2021-11-17 NOTE — Clinical Note
[address]  [facility] [address]  Phone: [phone]  Fax: [phone]    Yeny Hdez DO        November 19, 2021     Patient: Enid Tompkins   YOB: 1965   Date of Visit: 11/17/2021       To Whom It May Concern: It is my medical opinion that Yanira Kathrin {Work release (duty restriction):06370}. If you have any questions or concerns, please don't hesitate to call.     Sincerely,        Yeny Hdez DO

## 2021-11-19 NOTE — PROGRESS NOTES
[facility]    Patient's Name/Date of Birth: Catracho Phan / 1965 (60 y.o.)    Subjective:  Catracho Phan is a 60 y.o. male that presents to the [facility] for post operative evaluation after laparoscopic inguinal hernia repair on 9/21/21. Denies any pains. Had his US done showing a seroma/hematoma. No recurrent inguinal hernia. Pt tolerating diet. Ambulating. Wanting to go back to work. Objective:  Vitals:    11/17/21 1002   BP: 123/86   Pulse: 63     General:Appears healthy. Alert; in no acute distress. Pleasant. Heart: normal S1 and S2  Abdomen: soft, non-tender, no hernias  Skin: The incision(s) are well healed  Extremities: No edema present. Assessment/Plan:  Catracho Phan presents to the [facility] status post robotic inguinal hernia repair. We will follow up with Dusty AGUIAR. Released for full duty for work. Letter given to patient. Yuriy Ayala DO   11/19/2021 4:05 PM    Attending Physician Statement  I have discussed the case with Dr Jose Martin Ventura, including pertinent history and exam findings with the resident. I have seen and examined the patient and the key elements of the encounter have been performed by me. I agree with the assessment, plan and orders as documented by the resident.       Electronically signed by Abdoul Looney DO  on 11/24/2021 at 10:05 AM

## 2022-02-23 ENCOUNTER — HOSPITAL ENCOUNTER (OUTPATIENT)
Dept: PHYSICAL THERAPY | Age: 57
Setting detail: THERAPIES SERIES
Discharge: HOME | End: 2022-02-23
Payer: COMMERCIAL

## 2022-02-23 PROCEDURE — 97161 PT EVAL LOW COMPLEX 20 MIN: CPT

## 2022-02-23 PROCEDURE — 97110 THERAPEUTIC EXERCISES: CPT

## 2022-02-23 NOTE — CONSULTS
[x] [facility]) [facility]  [facility]  [address]  [phone]  F: [phone] [] [address]  [place] [address]   [address]  P: [phone]  F: [phone] [] [place]  [address]  P: [phone]  F: [phone] [] [address]  P: [phone]  F: [phone] [] [address]  [facility]   [address]   [place]: [phone]  F: [phone]      Physical Therapy Lower Extremity Evaluation    Date:  2022  Patient: Liliana Check  : 1965  MRN: [id number]  Physician: Dr. Gopal Valerio: Workers comp, maylin Shaver, 9 visits. Medical Diagnosis: Sprain medial collateral ligament of right knee; sprain of right knee    Rehab Codes: M 25.561, M 25.661, M 62.81, R 26.89, Z 91.81  Onset date: 2/10/22    Next Dr's appt. : 3/7/22    Subjective:   CC:  Right knee - the pain continues when trying to sleep, the pain will come abruptly and sharp. Tired of being on light duty. Currently doing paperwork/office stuff, answering phones. Walking is OK but cannot walk long periods of time . Knee pops, feels like it wants to give out. Feels like it gets stuck then pops. Lisa going up steps. Bedroom is upstairs at home. Has not been driving much to know if it aches. Knee aches when in bent position for a while. Cannot let knee fall down as it hurts posteriorly when it is in that position for long. HPI: (2/10/22)  Stepped in a pot hole. Did not fall.     PMHx: [] Unremarkable [] Diabetes [] HTN  [] Pacemaker   [] MI/Heart Problems [] Cancer [x] Arthritis [] Other:              [x] Refer to full medical chart  In EPIC     Comorbidities:   [] Obesity [] Dialysis  [] N/A   [] Asthma/COPD [] Dementia [x] Other: hernia surgery, right and left shoulder surgery. [] Stroke [] Sleep apnea [] Other:   [] Vascular disease [] Rheumatic disease [] Other:     Tests: [x] No recent x-rays    Medications: [x] Refer to full medical record [] None [] Other:  Allergies:      [x] Refer to full medical record [] None [] Other:    Function:  Hand Dominance  [x] Right  [] Left  Patient lives with: Wife   In what type of home []  One story   [x] Two story   [] Split level   Number of stairs to enter    With handrail on the []  Right to enter   [] Left to enter   Bathroom has a []  Tub only  [] Tub/shower combo   [] Walk in shower    []  Grab bars   Washing machine is on []  Main level   [] Second level   [] [employer] - works on construction sites. Job Status []  Normal duty   [x] Light duty   [] Off due to condition    []  Retired   [] Not employed   [] Disability  [] Other:  [x]  Return to work: currently working, no plan for return to full duty.    Work activities/duties      ADL/IADL Previous level of function Current level of function Who currently assists the patient with task   Bathing  [x] Independent  [] Assist [x] Independent  [] Assist    Dress/grooming [x] Independent  [] Assist [x] Independent  [] Assist    Transfer/mobility [x] Independent  [] Assist [x] Independent  [] Assist    Feeding [x] Independent  [] Assist [x] Independent  [] Assist    Toileting [x] Independent  [] Assist [x] Independent  [] Assist Has to use arms significantly to assist.   Driving [x] Independent  [] Assist [x] Independent  [] Assist    Housekeeping [x] Independent  [] Assist [x] Independent  [] Assist    Grocery shop/meal prep [x] Independent  [] Assist [x] Independent  [] Assist      Gait Prior level of function Current level of function    [x] Independent  [] Assist [x] Independent  [] Assist   Device: [x] Independent [x] Independent    [] Straight Cane [] Quad cane [] Straight .[facility] [] Quad cane    [] Standard walker [] Rolling walker   [] 4 wheeled walker [] Standard walker [] Rolling walker   [] 4 wheeled walker    [] Wheelchair [] Wheelchair     Pain:  [x] Yes  [] No Location: Right knee   Pain Rating: (0-10 scale) 5+/10  Pain altered Tx:  [] Yes  [x] No  Action:    Symptoms:  [] Improving [] Worsening [] Same  Better:  [] AM    [] PM    [] Sit    [] Rise/Sit    []Stand    [] Walk    [] Lying    [x] Other: icing and rest  Worse: [] AM    [] PM    [] Sit    [] Rise/Sit    [x]Stand    [x] Walk    [] Lying    [] Bend                      [] Valsalva    [] Other:  Sleep: [] OK    [x] Disturbed    Objective:    ROM  ° A/P STRENGTH TESTS (+/-) Left Right Not Tested    Left Right Left Right Ant.  Drawer  pn []   Hip Flex   4+ 4 Post. Drawer  + clunk, significant pain []   Ext     Lachmans   [x]   ER     Valgus Stress  pn []   IR     Varus Stress  pn []   ABD   4+ 4 Francisco Javiers   [x]   ADD   4+ 4 Apleys Comp.   []   Knee Flex 127 107 4+ 4- Apleys Dist.   []   Ext 0 Lack 9 to lack 5 + pn 4+ 4- Hip Scouring   []   Ankle DF   4+ 4 ANISHAs   []   PF     Piriformis   []   INV     Shans   []   EVER     Talor Tilt   []   Hamstring flexibility Lack 20 Lack 25   Pat-Fem Grind   []      Non- effected Initial, in cm Re-eval,in cm Comments   Suprapatellar 37.8 39.0     Joint Line 37.4 36.7     Infrapatellar 34.7 34.9 (over tibial tuberosity)       OBSERVATION No Deficit Deficit Not Tested Comments   Posture       Forward Head [x] [] []    Rounded Shoulders [] [x] []    Kyphosis [x] [] []    Lordosis [x] [] []    Lateral Shift [] [] [x]    Scoliosis [] [] [x]    Iliac Crest [x] [] []    PSIS [x] [] []    ASIS [x] [] []    Genu Valgus [x] [] []    Genu Varus [x] [] []    Genu Recurvatum [x] [] []    Pronation [x] [] []    Supination [x] [] []    Leg Length Discrp [x] [] []    Slumped Sitting [] [x] []    Palpation [] [x] [] Tender medial/lateral joint line, posterior knee, patellar tendon   Sensation [x] [] [] Edema [] [x] [] See chart above   Neurological [x] [] []    Patellar Mobility [x] [] [] Bilateral patellas equally mobile   Patellar Orientation [x] [] []    Gait [] [x] [] Analysis: decreased heel strike, decreased toe off, decreased terminal knee extension     FUNCTION Normal Difficult Unable   Sitting [] [x] []   Standing [] [x] []   Ambulation [] [x] []   Groom/Dress [] [x] []   Lift/Carry [] [x] []   Stairs [] [x] []   Bending [] [x] []   Squat [] [x] []   Kneel [] [] [x] for years     Functional Test: LEFS Score: 72% functionally impaired     Comments:    Assessment:  Patient would benefit from skilled physical therapy services in order to: improve right knee strength and ROM, improve gait, decrease risk of falls, improve stability and balance. Problems:    [x] ? Pain:  [x] ? ROM:  [x] ? Strength:  [x] ? Function:  [] Other:       STG: (to be met in 9 treatments)  1. ? Pain: Right knee pain improve to 3/10 at max during up/down steps  2. ? ROM: Right knee extension improve to 0 degrees with active extension  3. Right knee flexion improve to 115 degrees  4. ? Strength: Right knee strength improve to 4/5 (knee flex/ext) in sitting  5. ? Function: Patient able to go up/down steps reciprocally  6. Patient to be independent with home exercise program as demonstrated by performance with correct form without cues. 7. Patient able to return to work full duty                 Patient goals:  \"Get better to return to work\"    Rehab Potential:  [] Good  [x] Fair  [] Poor   Suggested Professional Referral:  [] No  [x] Yes: Possible referral to ortho; prior right knee injuries  Barriers to Goal Achievement:  [] No  [x] Yes: (+) shifting of tibia on femur with posterior drawer  Domestic Concerns:  [x] No  [] Yes:    Pt. Education:  [x] Plans/Goals, Risks/Benefits discussed  [x] Home exercise program    Method of Education: [x] Verbal  [x] Demo  [x] Written -- see chart below  Comprehension of Education:  [] Hiram Posey In/Out   [x] Evaluation       [x]  Low       []  Moderate       []  High 18 1 3051-6982   []  Modalities        [x]  Ther Exercise 4 1 5186-0227   []  Neuromuscular Re-ed      []  Gait Training      [x]  Manual Therapy 4 0 9834-5769   []  Ther Activities      []  Aquatics      []  Vasocompression      []  Cervical Traction      []  Other      Total Treatment time 26 min        TOTAL TREATMENT TIME: 26    Time in:1010   Time Out:1037    Electronically signed by: Dada Peng PT        Physician Signature:________________________________Date:__________________  By signing above or cosigning this note, I have reviewed this plan of care and certify a need for medically necessary rehabilitation services.      *PLEASE SIGN ABOVE AND FAX BACK ALL PAGES*

## 2022-02-25 ENCOUNTER — HOSPITAL ENCOUNTER (OUTPATIENT)
Dept: PHYSICAL THERAPY | Age: 57
Setting detail: THERAPIES SERIES
Discharge: HOME | End: 2022-02-25
Payer: COMMERCIAL

## 2022-02-25 PROCEDURE — 97016 VASOPNEUMATIC DEVICE THERAPY: CPT

## 2022-02-25 PROCEDURE — 97110 THERAPEUTIC EXERCISES: CPT

## 2022-02-25 NOTE — FLOWSHEET NOTE
Continue current frequency toward long and short term goals. [x] Specific Instructions for subsequent treatments: Stationary bike, if able. Add mat exercises (bridges, side hip abd, prone knee flex, prone hip ext, heel slides). Stand TKE, start with 2\" step up/down/lateral, balance exercises (heel-toe with eyes open/closed).       Time In: 8889            Time Out: 1630    Electronically signed by:  Diogo Em PTA

## 2022-02-28 ENCOUNTER — HOSPITAL ENCOUNTER (OUTPATIENT)
Dept: PHYSICAL THERAPY | Age: 57
Setting detail: THERAPIES SERIES
Discharge: HOME | End: 2022-02-28
Payer: COMMERCIAL

## 2022-02-28 PROCEDURE — 97110 THERAPEUTIC EXERCISES: CPT

## 2022-02-28 PROCEDURE — 97016 VASOPNEUMATIC DEVICE THERAPY: CPT

## 2022-02-28 NOTE — FLOWSHEET NOTE
[x] [facility]  [facility]  [address]  [phone]  F: [phone] [] [address]  [place] [address]   [address]  P: [phone]  F: [phone] [] [place]  [address]  P: [phone]  F: [phone] [] [address]  P: [phone]  F: [phone] [] [address]  [facility]   [address]   [place]: [phone]  F: [phone]      Physical Therapy Daily Treatment Note    Date:  2022  Patient Name:  Huey Loza    :  1965  MRN: [id number]  Physician: Dr. Goodson Pickup: Nadine conn, maylin Pereira, 9 visits. Medical Diagnosis: Sprain medial collateral ligament of right knee; sprain of right knee                 Rehab Codes: M 25.561, M 25.661, M 62.81, R 26.89, Z 91.81  Onset date: 2/10/22                 Next 's appt. : 3/7/22  Visit# / total visits: 3/9     Cancels/No Shows: 0/0    Subjective:    Pain:  [x] Yes  [] No  Location: R knee  Pain Rating: (0-10 scale) 1/10  Pain altered Tx:  [x] No  [] Yes  Action:  Comments: Patient arrives to therapy reporting very little pain. States his knee feels very unstable without the brace. Reports compliance with HEP. 2Objective:  Modalities:  Vaso compression R knee elevated Mod pressure  @ 34 degrees x15 min  Precautions:  Exercises:  All exercises performed with brace doffed  Exercise Reps/ Time Weight/ Level Comments   NuStep/ Stationary bike/  SciFit 5' Level 2           Standing      Incline stretch 3x30\"     Hamstring stretch 3x30\"     Step ups 20x 4\"    Lateral step ups 20x 4\"    Step downs 20x 4\"    TKE 20x Blue          Seated      LAQ 20x     Hamstring stretch 3x20\" Supine      SLR 10 x 2   HEP   Quad set 10 x 2 3 sec Fair quad contraction   SAQ 10 x 2   HEP   Manual tissue manipulation medial knee      Heel slides  20x   Painful   Bridges 10x                                 Other:      Treatment Charges: Mins Units    []  Modalities      [x]  Ther Exercise 35 2 3:50pm-4:25pm   []  Manual Therapy      []  Ther Activities      []  Aquatics      [x]  Vasocompression 15 1 4:25pm-4:40pm   []  Other      Total Treatment time 50 3 3:50pm-4:40pm   SciFit 3:45pm-3:50pm not billed    Assessment: [x] Progressing toward goals. Initiated treatment with SciFit followed by standing stretches with overall good tolerance of all exercises and progressions. Progressed step ups to 4in step with mild fatigued noted especially during eccentric lowering with R LE in all directions. Added bridges to progress hip strength with no pain reported. Pt states he typically has pain with R knee extension or having knee in the same position for prolong periods of time. Ended session with vasocompression to reduce pain with good relief noted at end of treatment. [] No change. [] Other:    [x] Patient would continue to benefit from skilled physical therapy services in order to: improve right knee   strength and ROM, improve gait, decrease risk of falls, improve stability and balance. STG: (to be met in 9 treatments)  1. ? Pain: Right knee pain improve to 3/10 at max during up/down steps  2. ? ROM: Right knee extension improve to 0 degrees with active extension  3. Right knee flexion improve to 115 degrees  4. ? Strength: Right knee strength improve to 4/5 (knee flex/ext) in sitting  5. ? Function: Patient able to go up/down steps reciprocally  6. Patient to be independent with home exercise program as demonstrated by performance with correct form without cues. 7. Patient able to return to work full duty                 Patient goals: \"Get better to return to work\"       Pt.  Education: [x] Yes  [] No  [x] Reviewed Prior HEP/Ed  Method of Education: [x] Verbal  [x] Demo  [] Written  Comprehension of Education:  [x] Verbalizes understanding. [x] Demonstrates understanding. [] Needs review. [x] Demonstrates/verbalizes HEP/Ed previously given. Plan: [x] Continue current frequency toward long and short term goals. [x] Specific Instructions for subsequent treatments: Stationary bike, if able. Add mat exercises (side hip abd, prone knee flex, prone hip ext, heel slides). Balance exercises (heel-toe with eyes open/closed).       Time In: 3:45pm            Time Out: 4:40pm    Electronically signed by:  Lashon Ferrer PTA

## 2022-03-02 ENCOUNTER — HOSPITAL ENCOUNTER (OUTPATIENT)
Dept: PHYSICAL THERAPY | Age: 57
Setting detail: THERAPIES SERIES
Discharge: HOME | End: 2022-03-02
Payer: COMMERCIAL

## 2022-03-02 PROCEDURE — 97110 THERAPEUTIC EXERCISES: CPT

## 2022-03-02 PROCEDURE — 97016 VASOPNEUMATIC DEVICE THERAPY: CPT

## 2022-03-02 NOTE — FLOWSHEET NOTE
[x] [facility]) [facility] &  Therapy  [address]  [phone]  F: [phone] [] [address]  [facility]   [address]  P: [phone]  F: [phone] [] [address]  [address]  P: [phone]  F: [phone] [] [address]  P: [phone]  F: [phone] [] [address]  [facility]   [address]   [place]: [phone]  F: [phone]      Physical Therapy Daily Treatment Note    Date:  3/2/2022  Patient Name:  Wanda Rosado    :  1965  MRN: [id number]  Physician: Dr. Villareal Gretchen: Nadine conn, maylin Owens, 9 visits. Medical Diagnosis: Sprain medial collateral ligament of right knee; sprain of right knee                 Rehab Codes: M 25.561, M 25.661, M 62.81, R 26.89, Z 91.81  Onset date: 2/10/22                 Next 's appt. : 3/7/22  Visit# / total visits: 4/9     Cancels/No Shows: 0/0    Subjective:    Pain:  [x] Yes  [] No  Location: R knee  Pain Rating: (0-10 scale) 2-3/10  Pain altered Tx:  [x] No  [] Yes  Action:  Comments: Patient arrives to therapy early but was able to accommodate. Reports pain in knee. Pain worse without the sleeve. 2Objective:  Modalities:  Vaso compression R knee elevated Mod pressure  @ 34 degrees x15 min  Precautions:  Exercises:  All exercises performed with brace doffed  Exercise Reps/ Time Weight/ Level Comments   NuStep/ Stationary bike/  SciFit 5' Level 2           Standing      Incline stretch 3x30\"     Hamstring stretch 3x30\"     Heel raises 20x     Step ups 20x 6\"    Lateral step ups 20x 4\"    Step downs 20x 4\"    TKE 20x Blue          Seated      LAQ 20x     Hamstring stretch 3x20\" Supine      SLR 10 x 2   weak   Quad set 10 x 2 3 sec    SAQ 10 x 2      Manual tissue manipulation medial knee 3 min     Heel slides  20x   Painful   Bridges 10x  Omitted in error                               Other:      Treatment Charges: Mins Units    []  Modalities      [x]  Ther Exercise 33 2 4:00pm-4:33pm   []  Manual Therapy      []  Ther Activities      []  Aquatics      [x]  Vasocompression 15 1 4:33pm-4:48pm   []  Other      Total Treatment time 48 3    SciFit 3:55pm-4:00pm not billed    Assessment: [x] Progressing toward goals. Completed standing exercises with R knee sleeve donned. Increased height of step with fair tolerance requiring need for UE support. Difficulty with SLR and quick to fatigue. Ended with vaso compression to decrease pain. [] No change. [] Other:    [x] Patient would continue to benefit from skilled physical therapy services in order to: improve right knee strength and ROM, improve gait, decrease risk of falls, improve stability and balance. STG: (to be met in 9 treatments)  1. ? Pain: Right knee pain improve to 3/10 at max during up/down steps  2. ? ROM: Right knee extension improve to 0 degrees with active extension  3. Right knee flexion improve to 115 degrees  4. ? Strength: Right knee strength improve to 4/5 (knee flex/ext) in sitting  5. ? Function: Patient able to go up/down steps reciprocally  6. Patient to be independent with home exercise program as demonstrated by performance with correct form without cues. 7. Patient able to return to work full duty                 Patient goals: \"Get better to return to work\"       Pt. Education:  [x] Yes  [] No  [x] Reviewed Prior HEP/Ed  Method of Education: [x] Verbal  [x] Demo  [] Written  Comprehension of Education:  [x] Verbalizes understanding. [x] Demonstrates understanding. [] Needs review. [x] Demonstrates/verbalizes HEP/Ed previously given.      Plan: [x] Continue current frequency toward long and short term goals. [x] Specific Instructions for subsequent treatments: Stationary bike, if able. Add mat exercises (side hip abd, prone knee flex, prone hip ext, heel slides). Balance exercises (heel-toe with eyes open/closed).       Time In: 3:45pm            Time Out: 4:40pm    Electronically signed by:  Orville Heredia PTA

## 2022-03-04 ENCOUNTER — HOSPITAL ENCOUNTER (OUTPATIENT)
Dept: PHYSICAL THERAPY | Age: 57
Setting detail: THERAPIES SERIES
Discharge: HOME | End: 2022-03-04
Payer: COMMERCIAL

## 2022-03-04 PROCEDURE — 97110 THERAPEUTIC EXERCISES: CPT

## 2022-03-04 NOTE — FLOWSHEET NOTE
[x] [facility]) - [facility] &  Therapy  [address]  [phone]  F: [phone] [] [address]  [place] [address]   [address]  P: [phone]  F: [phone] [] [place]  [address]  P: [phone]  F: [phone] [] [address]  P: [phone]  F: [phone] [] [address]  [facility]   [address]   [place]: [phone]  F: [phone]      Physical Therapy Daily Treatment Note    Date:  3/4/2022  Patient Name:  Scot Chiu    :  1965  MRN: [id number]  Physician: Dr. Vidal Silence: Workers comp, presumptive [place], 9 visits. Medical Diagnosis: Sprain medial collateral ligament of right knee; sprain of right knee                 Rehab Codes: M 25.561, M 25.661, M 62.81, R 26.89, Z 91.81  Onset date: 2/10/22                 Next Dr's appt. : 3/7/22  Visit# / total visits: 5/     Cancels/No Shows: 0/0    Subjective:    Pain:  [x] Yes  [] No  Location: R knee  Pain Rating: (0-10 scale) 1-2/10  Pain altered Tx:  [x] No  [] Yes  Action:  Comments: Patient arrives stating pain is minimal at arrival, however, knee continues to feel weak when brace is removed. \"It pops all the time\". Notes pain has improved since starting therapy, but weakness not as improved. 2Objective:  Modalities:  Vaso compression R knee elevated Mod pressure  @ 34 degrees x15 min -- Held due to none available 3/4  Precautions:  Exercises:  All exercises performed with brace doffed  Exercise Reps/ Time Weight/ Level Comments   NuStep/ Stationary bike/  SciFit 5' Level 2           Standing      Incline stretch 3x30\"     Hamstring stretch 3x30\"     Heel raises 20x 2lb    Power step ups 20x 6\"    Lateral step ups 20x 4\"    Step downs 20x 4\"    Hip abduction/Adduction 15x Lime Added 3/4   TKE 20x Blue    Matrix HS/quad  2x10 35lb Added 3/4                Seated      LAQ 20x     Hamstring stretch 3x20\"                 Supine      SLR 10 x 2   weak   Quad set 10 x 2 3 sec    SAQ 10 x 2      Manual tissue manipulation medial knee 3 min     Heel slides  20x   Painful   Bridges 15x           Sidelying      Hip abduction      Clamshells                            Other:      Treatment Charges: Mins Units    []  Modalities      [x]  Ther Exercise 40 3 350-430pm   []  Manual Therapy      []  Ther Activities      []  Aquatics      []  Vasocompression      []  Other      Total Treatment time 40 3      SciFit 3:     Assessment: [x] Progressing toward goals. Progressed LE strengthening with addition of resisted hip abd/adduction for continued strengthening of lateral musculature. Patient initially with difficulty at beginning weight on matrix HS/quad machine, improved tolerance and control with reduction to 35lb for both directions. Patient with fatigue at conclusion of treatment, however, no vaso available in clinic, thus patient stated will ice at home for soreness relief. [] No change. [] Other:    [x] Patient would continue to benefit from skilled physical therapy services in order to: improve right knee strength and ROM, improve gait, decrease risk of falls, improve stability and balance. STG: (to be met in 9 treatments)  1. ? Pain: Right knee pain improve to 3/10 at max during up/down steps  2. ? ROM: Right knee extension improve to 0 degrees with active extension  3. Right knee flexion improve to 115 degrees  4. ? Strength: Right knee strength improve to 4/5 (knee flex/ext) in sitting  5. ? Function: Patient able to go up/down steps reciprocally  6.  Patient to be independent with home exercise program as demonstrated by performance with correct form without cues.  7. Patient able to return to work full duty                 Patient goals: \"Get better to return to work\"       Pt. Education:  [x] Yes  [] No  [x] Reviewed Prior HEP/Ed  Method of Education: [x] Verbal  [x] Demo  [] Written  Comprehension of Education:  [x] Verbalizes understanding. [x] Demonstrates understanding. [] Needs review. [x] Demonstrates/verbalizes HEP/Ed previously given. Plan: [x] Continue current frequency toward long and short term goals. [x] Specific Instructions for subsequent treatments: Stationary bike, if able. Add sidelying, Balance exercises (heel-toe with eyes open/closed).       Time In: 3:45 pm            Time Out: 4:30 pm    Electronically signed by:  Ld Recinos PTA

## 2022-03-07 ENCOUNTER — HOSPITAL ENCOUNTER (OUTPATIENT)
Dept: PHYSICAL THERAPY | Age: 57
Setting detail: THERAPIES SERIES
Discharge: HOME | End: 2022-03-07
Payer: COMMERCIAL

## 2022-03-07 PROCEDURE — 97016 VASOPNEUMATIC DEVICE THERAPY: CPT

## 2022-03-07 PROCEDURE — 97110 THERAPEUTIC EXERCISES: CPT

## 2022-03-07 NOTE — FLOWSHEET NOTE
[x] [facility]) [facility] &  Therapy  [address]  [phone]  F: [phone] [] [address]  [place] [address]   [address]  P: [phone]  F: [phone] [] [place]  [address]  P: [phone]  F: [phone] [] [address]  P: [phone]  F: [phone] [] [address]  [facility]   [address]   [place]: [phone]  F: [phone]      Physical Therapy Daily Treatment Note    Date:  3/7/2022  Patient Name:  Antonio Oneal    :  1965  MRN: [id number]  Physician: Dr. Kavitha To: Nadine conn, maylin Back, 9 visits. Medical Diagnosis: Sprain medial collateral ligament of right knee; sprain of right knee                 Rehab Codes: M 25.561, M 25.661, M 62.81, R 26.89, Z 91.81  Onset date: 2/10/22                 Next 's appt. : 3/16/22  Visit# / total visits: 6/9     Cancels/No Shows: 0/0    Subjective:    Pain:  [x] Yes  [] No  Location: R knee  Pain Rating: (0-10 scale) 1/10  Pain altered Tx:  [x] No  [] Yes  Action:  Comments: Patient arrives reporting minimal pain this date. Reports R knee \"still feels unstable. \"   Objective:  Modalities:  Vaso compression R knee elevated Mod pressure  @ 34 degrees x15 min   Precautions:  Exercises:  All exercises performed with brace doffed  Exercise Reps/ Time Weight/ Level Comments   NuStep/ Stationary bike/  SciFit 5' Level 2           Standing      Incline stretch 3x30\"     Hamstring stretch 3x30\"     Heel raises 20x 2lb    Power step ups 20x 6\"    Lateral step ups 20x 4\"    Step downs 20x 6\"    Step taps 10x2 4\" Lateral, R CKC   3 way hip 15x ea Lime    TKE 20x Blue    Matrix HS/quad  2x10 35lb Added 3/4    R LE SLS 1x30\"           Seated      LAQ 20x 2lb    Hamstring stretch 3x20\"                 Supine      SLR 10 x 2   weak   Quad set 10 x 2 3 sec    SAQ 10 x 2      Manual tissue manipulation medial knee      Heel slides  20x   Painful   Bridges 2x10           Sidelying      Hip abduction      Clamshells                            Other:      Treatment Charges: Mins Units    []  Modalities      [x]  Ther Exercise 40 3 3:55-4:35pm   []  Manual Therapy      []  Ther Activities      []  Aquatics      [x]  Vasocompression 15 1 4:35-4:50pm   []  Other      Total Treatment time 55 4 3:55-4:50pm     SciFit 3:48pm-3:53pm     Assessment: [x] Progressing toward goals. Added lateral steps taps to progress R eccentric quad control with overall fair tolerance just fatigued noted during second set. Also added 3 way hip with a resistance band to progress hip strength with good tolerance just mild fatigue noted from prolonged single leg stance. Increased reps for bridges and resistance for LAQ with good tolerance and no report of increasing pain. Ended session with vasocompression to continue to reduce pain symptoms with good relief noted at end of treatment. [] No change. [] Other:    [x] Patient would continue to benefit from skilled physical therapy services in order to: improve right knee strength and ROM, improve gait, decrease risk of falls, improve stability and balance. STG: (to be met in 9 treatments)  1. ? Pain: Right knee pain improve to 3/10 at max during up/down steps  2. ? ROM: Right knee extension improve to 0 degrees with active extension  3. Right knee flexion improve to 115 degrees  4. ? Strength: Right knee strength improve to 4/5 (knee flex/ext) in sitting  5. ? Function: Patient able to go up/down steps reciprocally  6. Patient to be independent with home exercise program as demonstrated by performance with correct form without cues.   7. Patient able to return to work full duty Patient goals: \"Get better to return to work\"       Pt. Education:  [x] Yes  [] No  [x] Reviewed Prior HEP/Ed  Method of Education: [x] Verbal  [x] Demo  [] Written  Comprehension of Education:  [x] Verbalizes understanding. [x] Demonstrates understanding. [] Needs review. [x] Demonstrates/verbalizes HEP/Ed previously given. Plan: [x] Continue current frequency toward long and short term goals. [x] Specific Instructions for subsequent treatments: Stationary bike, if able. Add sidelying, Balance exercises (heel-toe with eyes open/closed).       Time In: 3:47 pm            Time Out: 4:50 pm    Electronically signed by:  Dorinda Flores PTA

## 2022-03-09 ENCOUNTER — HOSPITAL ENCOUNTER (OUTPATIENT)
Dept: PHYSICAL THERAPY | Age: 57
Setting detail: THERAPIES SERIES
Discharge: HOME | End: 2022-03-09
Payer: COMMERCIAL

## 2022-03-09 PROCEDURE — 97110 THERAPEUTIC EXERCISES: CPT

## 2022-03-09 PROCEDURE — 97016 VASOPNEUMATIC DEVICE THERAPY: CPT

## 2022-03-09 NOTE — FLOWSHEET NOTE
[x] [facility]) [facility] &  Therapy  [address]  [phone]  F: [phone] [] [address]  [facility]   [address]  P: [phone]  F: [phone] [] [address]  [address]  P: [phone]  F: [phone] [] [address]  P: [phone]  F: [phone] [] [address]  [facility]   [address]   [place]: [phone]  F: [phone]      Physical Therapy Daily Treatment Note    Date:  3/9/2022  Patient Name:  Marisela Mercado    :  1965  MRN: [id number]  Physician: Dr. Katrina Roche: Workers comp, presumptive [place], 9 visits. Medical Diagnosis: Sprain medial collateral ligament of right knee; sprain of right knee                 Rehab Codes: M 25.561, M 25.661, M 62.81, R 26.89, Z 91.81  Onset date: 2/10/22                 Next 's appt. : 3/16/22  Visit# / total visits: 7     Cancels/No Shows: 0/0     Subjective:    Pain:  [x] Yes  [] No  Location: R knee  Pain Rating: (0-10 scale) 1/10  Pain altered Tx:  [x] No  [] Yes  Action:  Comments: Patient arrives to therapy reporting lateral R knee pain this date. Objective:  Modalities:  Vaso compression R knee elevated Mod pressure  @ 34 degrees x15 min    Precautions:  Exercises:  All exercises performed with brace doffed  Exercise Reps/ Time Weight/ Level Comments   NuStep/ Stationary bike/  SciFit 5' Level 2           Standing      Incline stretch 3x30\"     Hamstring stretch 3x30\"     Heel raises 20x 2lb    Power step ups 20x 6\"    Lateral step ups 20x 4\" HELD 3/9 due to pain   Step downs 20x 6\"    Step taps 10x2 4\" Lateral, R CKC, HELD 3/9 due to pain   3 way hip 15x ea Lime    TKE 20x Blue    Matrix HS/quad  2x10 35lb Added 3/4    R LE SLS 1x30\"           Seated      LAQ 20x 2lb    Hamstring stretch 3x20\"                 Supine      SLR 10 x 2   weak   Quad set 10 x 2 3 sec    SAQ 10 x 2      Manual tissue manipulation medial knee      Heel slides  20x   Painful   Bridges 2x10           Sidelying      Hip abduction      Clamshells                            Other:      Treatment Charges: Mins Units    []  Modalities      [x]  Ther Exercise 40 3 3:45-4:25pm   []  Manual Therapy      []  Ther Activities      []  Aquatics      [x]  Vasocompression 15 1 4:25-4:40pm   []  Other      Total Treatment time 55 4 3:45-4:40pm     SciFit 3:40pm-3:45pm     Assessment: [x] Progressing toward goals. Continued with exercises per log with overall fair tolerance of all exercises- good recall with min verbal cues required for exercise technique. Held lateral steps and step taps with date due to increasing knee pain and crepitus noted during step ups/down. Ended session with vasocompression to reduce pain symptoms with good relief noted at end of treatment. [] No change. [] Other:    [x] Patient would continue to benefit from skilled physical therapy services in order to: improve right knee strength and ROM, improve gait, decrease risk of falls, improve stability and balance. STG: (to be met in 9 treatments)  1. ? Pain: Right knee pain improve to 3/10 at max during up/down steps  2. ? ROM: Right knee extension improve to 0 degrees with active extension  3. Right knee flexion improve to 115 degrees  4. ? Strength: Right knee strength improve to 4/5 (knee flex/ext) in sitting  5. ? Function: Patient able to go up/down steps reciprocally  6. Patient to be independent with home exercise program as demonstrated by performance with correct form without cues. 7. Patient able to return to work full duty                 Patient goals: \"Get better to return to work\"       Pt.  Education:  [x] Yes  [] No  [x] Reviewed Prior HEP/Ed  Method of Education: [x] Verbal  [x] Demo  [] Written  Comprehension of Education:  [x] Verbalizes understanding. [x] Demonstrates understanding. [] Needs review. [x] Demonstrates/verbalizes HEP/Ed previously given. Plan: [x] Continue current frequency toward long and short term goals. [x] Specific Instructions for subsequent treatments: Stationary bike, if able. Add sidelying, Balance exercises (heel-toe with eyes open/closed).       Time In: 3:40 pm            Time Out:  4:40pm    Electronically signed by:  Corey Pace PTA

## 2022-03-10 ENCOUNTER — HOSPITAL ENCOUNTER (OUTPATIENT)
Dept: PHYSICAL THERAPY | Age: 57
Setting detail: THERAPIES SERIES
Discharge: HOME | End: 2022-03-10
Payer: COMMERCIAL

## 2022-03-10 NOTE — FLOWSHEET NOTE
[x] [address]  [address]    [phone]  F: [phone]   [] [address]  [facility] 36   [address]  P: [phone]  F: [phone]  [] [place]  [address] [facility]  P: [phone]  F: [phone] [] [address]: [phone]  F: [phone]  [] [address]  [facility]   [address]   [place]: [phone]  F: [phone]   [] [place] [id number] [facility] [address]  [place]: [phone]   F: [phone]     Physical Therapy Cancel/No Show note    Date: 3/10/2022  Patient: Kilo Sanford  : 1965  MRN: [id number]    Cancels/No Shows to date:     For today's appointment patient:    [x]  Cancelled    [] Rescheduled appointment    [] No-show     Reason given by patient:    []  Patient ill    []  Conflicting appointment    [] No transportation      [] Conflict with work    [] No reason given    [] Weather related    [] COVID-19    [x] Other:      Comments:  Something to do with his mother      [x] Next appointment was confirmed    Electronically signed by: Robert Ramsey PTA

## 2022-03-15 ENCOUNTER — HOSPITAL ENCOUNTER (OUTPATIENT)
Dept: PHYSICAL THERAPY | Age: 57
Setting detail: THERAPIES SERIES
Discharge: HOME | End: 2022-03-15
Payer: COMMERCIAL

## 2022-03-15 PROCEDURE — 97110 THERAPEUTIC EXERCISES: CPT

## 2022-03-15 PROCEDURE — 97016 VASOPNEUMATIC DEVICE THERAPY: CPT

## 2022-03-15 NOTE — FLOWSHEET NOTE
[x] [facility]) [facility] CENTER &  Therapy  [address]  [phone]  F: [phone] [] [address]  [facility]   [address]  P: [phone]  F: [phone] [] [address]  [address]  P: [phone]  F: [phone] [] [address]  P: [phone]  F: [phone] [] [address]  [facility]   [address]   [place]: [phone]  F: [phone]      Physical Therapy Daily Treatment Note    Date:  3/15/2022  Patient Name:  Andrea Newell    :  1965  MRN: [id number]  Physician: Dr. Bharat Fuller: Workers comp, presumptive [place], 9 visits. Medical Diagnosis: Sprain medial collateral ligament of right knee; sprain of right knee                 Rehab Codes: M 25.561, M 25.661, M 62.81, R 26.89, Z 91.81  Onset date: 2/10/22                 Next 's appt. : 3/16/22  Visit# / total visits: 8/9     Cancels/No Shows: 1/0     Subjective:    Pain:  [x] Yes  [] No  Location: R knee  Pain Rating: (0-10 scale) 2-3/10  Pain altered Tx:  [x] No  [] Yes  Action:  Comments: Says \"left knee is starting to bother him because he's putting more weight on it than normal.\"    Objective:  Modalities:  Vaso compression R knee elevated Mod pressure  @ 34 degrees x15 min    Precautions:  Exercises:  All exercises performed with brace doffed  Exercise Reps/ Time Weight/ Level Comments   NuStep/ Stationary bike/  SciFit 5' Level 4 Increased resistance 3/15/22         Standing      Incline stretch 3x30\"     Hamstring stretch 3x30\"     Heel raises 20x 2lb    Power step ups 15x 6\" Regressed reps 3/15/22 due to pain   Lateral step ups 20x 4\"    Step downs 20x 6\"    Step taps 10x2 4\" Lateral, R HARLEYC 3 way hip 15x ea Lime    TKE 20x Blue    Matrix HS/quad   35lb Added 3/4   R LE SLS 1x30\"           Seated      LAQ 20x 2lb    Hamstring stretch 3x20\"                 Supine      SLR 10 x 2   weak   Quad set 10 x 2 3 sec    SAQ 10 x 2      Manual tissue manipulation medial knee      Heel slides  20x   Painful   Bridges 2x10           Sidelying      Hip abduction      Clamshells                            Other:      Treatment Charges: Mins Units    []  Modalities      [x]  Ther Exercise 40 3 4:00p-4:40m   []  Manual Therapy      []  Ther Activities      []  Aquatics      [x]  Vasocompression 15 1 4:40-4:55pm   []  Other      Total Treatment time 55 4 4:00-4:55pm     SciFit 3:55pm-4:55pm     Assessment: [x] Progressing toward goals. Increased resistance on SciFit to improve BLE strength. Regressed reps of power steps from 20 to 15 due to increased R knee pain. Cont with vaso at the end of treatment to help relieve R knee pain. [] No change. [x] Other: Patient has follow up with doctor 3/16/22 to discuss R knee. [x] Patient would continue to benefit from skilled physical therapy services in order to: improve right knee strength and ROM, improve gait, decrease risk of falls, improve stability and balance. STG: (to be met in 9 treatments)  1. ? Pain: Right knee pain improve to 3/10 at max during up/down steps  2. ? ROM: Right knee extension improve to 0 degrees with active extension  3. Right knee flexion improve to 115 degrees  4. ? Strength: Right knee strength improve to 4/5 (knee flex/ext) in sitting  5. ? Function: Patient able to go up/down steps reciprocally  6. Patient to be independent with home exercise program as demonstrated by performance with correct form without cues. 7. Patient able to return to work full duty                 Patient goals: \"Get better to return to work\"       Pt.  Education:  [x] Yes  [] No  [x] Reviewed Prior HEP/Ed  Method of Education: [x] Verbal  [x] Demo [] Written  Comprehension of Education:  [x] Verbalizes understanding. [x] Demonstrates understanding. [] Needs review. [x] Demonstrates/verbalizes HEP/Ed previously given. Plan: [x] Continue current frequency toward long and short term goals. [x] Specific Instructions for subsequent treatments: Stationary bike, if able. Add sidelying, Balance exercises (heel-toe with eyes open/closed).       Time In: 3:55 pm            Time Out:  4:55pm    Electronically signed by:  EMILIANO Morocho   Treatment under the supervision of, and documentation reviewed by: Ned Goldmann, PTA

## 2022-03-24 ENCOUNTER — OFFICE VISIT (OUTPATIENT)
Dept: ORTHOPEDIC SURGERY | Age: 57
End: 2022-03-24
Payer: COMMERCIAL

## 2022-03-24 VITALS — WEIGHT: 217 LBS | BODY MASS INDEX: 29.39 KG/M2 | HEIGHT: 72 IN

## 2022-03-24 DIAGNOSIS — M17.11 ARTHRITIS OF RIGHT KNEE: ICD-10-CM

## 2022-03-24 DIAGNOSIS — S83.411A SPRAIN OF MEDIAL COLLATERAL LIGAMENT OF RIGHT KNEE, INITIAL ENCOUNTER: Primary | ICD-10-CM

## 2022-03-24 DIAGNOSIS — M25.561 RIGHT KNEE PAIN, UNSPECIFIED CHRONICITY: Primary | ICD-10-CM

## 2022-03-24 PROCEDURE — 99203 OFFICE O/P NEW LOW 30 MIN: CPT
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
## 2022-04-07 ENCOUNTER — OFFICE VISIT (OUTPATIENT)
Dept: ORTHOPEDIC SURGERY | Age: 57
End: 2022-04-07
Payer: COMMERCIAL

## 2022-04-07 VITALS — WEIGHT: 217 LBS | BODY MASS INDEX: 29.39 KG/M2 | HEIGHT: 72 IN

## 2022-04-07 DIAGNOSIS — S83.411A SPRAIN OF MEDIAL COLLATERAL LIGAMENT OF RIGHT KNEE, INITIAL ENCOUNTER: Primary | ICD-10-CM

## 2022-04-07 PROCEDURE — 20610 DRAIN/INJ JOINT/BURSA W/O US: CPT | Performed by: STUDENT IN AN ORGANIZED HEALTH CARE EDUCATION/TRAINING PROGRAM

## 2022-04-07 RX ORDER — BUPIVACAINE HYDROCHLORIDE 2.5 MG/ML
2 INJECTION, SOLUTION INFILTRATION; PERINEURAL ONCE
Status: COMPLETED | OUTPATIENT
Start: 2022-04-07 | End: 2022-04-07

## 2022-04-07 RX ORDER — METHYLPREDNISOLONE ACETATE 80 MG/ML
80 INJECTION, SUSPENSION INTRA-ARTICULAR; INTRALESIONAL; INTRAMUSCULAR; SOFT TISSUE ONCE
Status: COMPLETED | OUTPATIENT
Start: 2022-04-07 | End: 2022-04-07

## 2022-04-07 RX ADMIN — METHYLPREDNISOLONE ACETATE 80 MG: 80 INJECTION, SUSPENSION INTRA-ARTICULAR; INTRALESIONAL; INTRAMUSCULAR; SOFT TISSUE at 13:00

## 2022-04-07 RX ADMIN — BUPIVACAINE HYDROCHLORIDE 5 MG: 2.5 INJECTION, SOLUTION INFILTRATION; PERINEURAL at 13:00

## 2022-04-07 NOTE — LETTER
[facility]  [address] [facility] [address] [facility]  Phone: [phone]  Fax: [phone]    Ara Mccrary DO        April 7, 2022     Patient: Brooke Thomas   YOB: 1965   Date of Visit: 4/7/2022       To Whom It May Concern:    Please excuse Catalina Benji from work on 04/07/2022. Catalina Leblanc was present at his scheduled appointment. If you have any questions or concerns, please don't hesitate to call.     Sincerely,        Ara Mccrary DO

## 2022-04-07 NOTE — PROGRESS NOTES
[address]  [address] [facility] [address]  Dept: [phone]  Dept Fax: [phone]        Ambulatory Follow Up    Subjective:   Lakeisha Cervantes is a 59y.o. year old male who presents to our office today for routine followup regarding his   1. Sprain of medial collateral ligament of right knee, initial encounter      Chief Complaint   Patient presents with    Follow-up     [address] [address] - approved     HPI  Mr. Joanna Mccord is a 59year old male presenting to the office after workers comp approved via [employer] the ability for him to obtain a corticosteroid injection to his right knee as well as obtain a medial  brace. Review of Systems    I have reviewed the CC, HPI, ROS, PMH, FHX, Social History. I agree with the documentation provided by other staff, residents, and/or medical students and have reviewed their documentation prior to providing my signature indicating agreement. Objective :   General: Lakeisha Cervantes is a 59 y.o. male who is alert and oriented and sitting comfortably in our office. Ortho Exam  RLE - no significant swelling or effusion appreciated. ROM from 5-110 degrees with crepitance noted. TTP medially over the MCL as well as anteriormedial joint line. Varus and valgus stress tests stable at 0 and 30 degrees, mild discomfort medially. Neuro: alert. oriented  Eyes: Extra-ocular muscles intact  Mouth: Oral mucosa moist. No perioral lesions  Pulm: Respirations unlabored and regular. Skin: warm, well perfused  Psych:   Patient has good fund of knowledge and displays understanging of exam, diagnosis, and plan. Radiology:   No radiographs taken in the office today. Assessment:      1. Sprain of medial collateral ligament of right knee, initial encounter       Plan:   1. Corticosteroid injection provided to the right knee, see procedure note below.     2. Information provided for obtaining and medial  brace, Michael will contact the patient for determining a time to get fitted. 3. We will request a C9 for continuing physical therapy for the right knee. 4. Patient to follow up in 3 months to see how he is progressing after the injection, brace and ultimately continuation of physical therapy. Injection procedure note  The alternatives, benefits, and risks were discussed with the patient. After answering all questions to the patient's satisfaction, the patient agreed to proceed forward with injection and gave verbal consent for the procedure. With the patient's permission, appropriate anatomic landmarks were identified and the right knee joint was prepped in a sterile fashion using alcohol and/or betadine. A 25 gauge needle was then used to inject 2cc 0.25% marcaine plain and 80mg depo medrol into the joint. The injection was advanced without resistance confirming appropriate position. The patient tolerated the procedure well and the site was dressed with a band-aid. Patient was advised to ice the area for 15-20 minutes to relieve any injection site related pain. Patient was advised to contact nurse if area becomes swollen, hot, erythematous, or painful, or to go to the emergency room after business hours. Follow up:Return in about 3 months (around 7/7/2022) for Evaluation without X-rays.     Orders Placed This Encounter   Medications    methylPREDNISolone acetate (DEPO-MEDROL) injection 80 mg    bupivacaine (MARCAINE) 0.25 % injection 5 mg          Orders Placed This Encounter   Procedures    07260 - DRAIN/INJECT LARGE JOINT/BURSA       Electronically signed by Maricarmen Pérez DO   Orthopedic Surgery Resident PGY-5  WINNIE Gardner[address]   4/7/2022 at 11:15 AM

## 2022-04-11 NOTE — PROGRESS NOTES
I performed a history and physical examination of the patient and discussed management with the resident. I reviewed the physician assistant/resident physician note and agree with the documented findings and plan of care. Any areas of disagreement are noted on the chart. I have personally evaluated this patient and have completed at least one if not all key elements of the E/M (history, physical exam, and MDM). Additional findings are as noted. I agree with the chief complaint, past medical history, past surgical history, allergies, medications, social and family history as documented unless otherwise noted below.      Electronically signed by Srini Davila DO on 4/11/2022 at 7:15 AM

## 2022-04-15 ENCOUNTER — TELEPHONE (OUTPATIENT)
Dept: ORTHOPEDIC SURGERY | Age: 57
End: 2022-04-15

## 2022-04-15 NOTE — TELEPHONE ENCOUNTER
Called patient and let him know his C9 for PT was approved. Patient will reach out to PT to schedule. Approval is in media.

## 2022-04-19 ENCOUNTER — HOSPITAL ENCOUNTER (OUTPATIENT)
Dept: PHYSICAL THERAPY | Age: 57
Setting detail: THERAPIES SERIES
Discharge: HOME | End: 2022-04-19
Payer: COMMERCIAL

## 2022-04-19 PROCEDURE — 97110 THERAPEUTIC EXERCISES: CPT

## 2022-04-19 NOTE — FLOWSHEET NOTE
[x] [facility]) - [facility] TWELVESTEP [facility] &  Therapy  [address]  [phone]  F: [phone] [] [address]  [place] [address]   [address]  P: [phone]  F: [phone] [] [address] &  Therapy  [address]  P: [phone]  F: [phone] [] [address]  P: [phone]  F: [phone] [] [address]  [facility]   [address]   [place] To: [phone]  F: [phone]      Physical Therapy Daily Treatment Note    Date:  2022  Patient Name:  Starr Alicea    :  1965  MRN: [id number]  Physician: Dr. Jim Love: Workers comp, 12 visits approved from 22 to 22  Medical Diagnosis: Sprain medial collateral ligament of right knee; sprain of right knee                 Rehab Codes: M 25.561, M 25.661, M 62.81, R 26.89, Z 91.81  Onset date: 2/10/22                 Next 's appt. Larna Form as needed, Ortho 22   Visit# / total visits:      Cancels/No Shows: 0/0     Subjective:    Pain:  [x] Yes  [] No  Location: R knee  Pain Rating: (0-10 scale) 2/10  Pain altered Tx:  [x] No  [] Yes  Action:  Comments: States pain is right knee is 2/10. Had an injection in knee. Objective:  Modalities:  Vaso compression R knee elevated Mod pressure  @ 34 degrees x15 min--HELD this date, patient declined need    Precautions:  Exercises:  All exercises performed with brace doffed  Exercise Reps/ Time Weight/ Level Comments   NuStep/ Stationary bike/  SciFit 5' Level 4 Increased resistance 3/15/22         Standing      Incline stretch    3x30\"     Hamstring stretch 2x30\"     Heel raises    20x     Power step ups   15x 6\" Pain in knee noted   Lateral step ups    20x 4\"    Step downs    20x 6\" Step taps    10x2 4\" Lateral, R CKC   3 way hip     15x ea Lime    TKE    20x Blue    Matrix HS/quad       R LE SLS  1x30\"           Seated      LAQ 20x     Hamstring stretch                  Supine      SLR 15x      Quad set      SAQ 10 x 2      Manual tissue manipulation medial knee      Heel slides  15x   Painful   Bridges 15x           Sidelying      Hip abduction 15x     Clamshells 15x                           Other:      Treatment Charges: Mins Units    []  Modalities      [x]  Ther Exercise 35 2 7057-4228   []  Manual Therapy      []  Ther Activities      []  Aquatics      []  Vasocompression      []  Other      Total Treatment time 35 mins     SciFit [id number]    Assessment: [x] Progressing toward goals. Resumed exercises per log secondary to lapse in treatment. Cues for exercise techniques and progressions with good carryover. Patient noted some pain with power steps up and heel slides in patellar tendon region. Held game ready this date per patient request.     [] No change. [] Other:     [x] Patient would continue to benefit from skilled physical therapy services in order to: improve right knee strength and ROM, improve gait, decrease risk of falls, improve stability and balance. STG: (to be met in 9 treatments)  ? Pain: Right knee pain improve to 3/10 at max during up/down steps  ? ROM: Right knee extension improve to 0 degrees with active extension  Right knee flexion improve to 115 degrees  ? Strength: Right knee strength improve to 4/5 (knee flex/ext) in sitting  ? Function: Patient able to go up/down steps reciprocally  Patient to be independent with home exercise program as demonstrated by performance with correct form without cues. Patient able to return to work full duty                 Patient goals: \"Get better to return to work\"       Pt.  Education:  [x] Yes  [] No  [x] Reviewed Prior HEP/Ed  Method of Education: [x] Verbal  [x] Demo  [] Written  Comprehension of Education:  [x] Verbalizes understanding. [x] Demonstrates understanding. [] Needs review. [x] Demonstrates/verbalizes HEP/Ed previously given. Plan: [x] Continue current frequency toward long and short term goals. [x] Specific Instructions for subsequent treatments: Balance exercises (heel-toe with eyes open/closed).  Resume leg press if able      Time In: 3:41 pm            Time Out: 4:22 pm    Electronically signed by:  Ganesh Fernandez PTA

## 2022-04-21 ENCOUNTER — HOSPITAL ENCOUNTER (OUTPATIENT)
Dept: PHYSICAL THERAPY | Age: 57
Setting detail: THERAPIES SERIES
Discharge: HOME | End: 2022-04-21
Payer: COMMERCIAL

## 2022-04-21 NOTE — FLOWSHEET NOTE
[x] [address]  [address]    [phone]  F: [phone]   [] [address] [employer] [facility] [address]   [address]  P: [phone]  F: [phone]  [] [address]  [address] [facility]  P: [phone]  F: [phone] [] [facility] [address]: [phone]  F: [phone]  [] [address]  [address] [facility] [address]   [address]   [place]: [phone]  F: [phone]   [] [address]  [place]: [phone]   F: [phone]     Physical Therapy Cancel/No Show note    Date: 2022  Patient: Madi Monday  : 1965  MRN: [id number]    Cancels/No Shows to date:     For today's appointment patient:    [x]  Cancelled    [] Rescheduled appointment    [] No-show     Reason given by patient:    []  Patient ill    []  Conflicting appointment    [] No transportation      [x] Conflict with work    [] No reason given    [] Weather related    [] COVID-19    [x] Other:      Comments:  Patient called stating he is still at work       [x] Next appointment was confirmed    Electronically signed by: Kelton Scale, PTA

## 2022-04-26 ENCOUNTER — HOSPITAL ENCOUNTER (OUTPATIENT)
Dept: PHYSICAL THERAPY | Age: 57
Setting detail: THERAPIES SERIES
Discharge: HOME | End: 2022-04-26
Payer: COMMERCIAL

## 2022-04-26 PROCEDURE — 97110 THERAPEUTIC EXERCISES: CPT

## 2022-04-26 PROCEDURE — 97016 VASOPNEUMATIC DEVICE THERAPY: CPT

## 2022-04-26 NOTE — FLOWSHEET NOTE
[x] [facility] TWELVESTEP [facility] &  Therapy  [address]  [phone]  F: [phone] [] [address]  [employer] [address]   [address]  P: [phone]  F: [phone] [] [address] &  Therapy  [address] [facility] [address]  P: [phone]  F: [phone] [] [address]  P: [phone]  F: [phone] [] [address]  [facility]   [address]   [place]: [phone]  F: [phone]      Physical Therapy Daily Treatment Note    Date:  2022  Patient Name:  Homar Bolanos    :  1965  MRN: [id number]  Physician: Dr. Lisbet Mc: Workers comp, 12 visits approved from 22 to 22  Medical Diagnosis: Sprain medial collateral ligament of right knee; sprain of right knee                 Rehab Codes: M 25.561, M 25.661, M 62.81, R 26.89, Z 91.81  Onset date: 2/10/22                 Next 's appt. Trisha Lowe as needed, Ortho 22    Visit# / total visits: 212     Cancels/No Shows: 1/0     Subjective:    Pain:  [x] Yes  [] No  Location: R knee  Pain Rating: (0-10 scale) 2/10  Pain altered Tx:  [x] No  [] Yes  Action:  Comments: Patient reports a decrease in pain following the pain injection in the R knee. Objective:  Modalities:  Vaso compression R knee elevated Mod pressure  @ 34 degrees x15 min--    Precautions:  Exercises:  All exercises performed with brace doffed  Exercise Reps/ Time Weight/ Level Comments   NuStep/ Stationary bike/  SciFit 5' Level 5 Increased resistance 22         Standing      Incline stretch    3x30\"     Hamstring stretch 2x30\"     Heel raises    20x     Power step ups   15x 6\" Pain in knee noted   Lateral step ups    20x 4\"    Step downs    20x 6\"    Step taps    10x2 6\" Lateral, R CKC   3 way hip     15x ea Blue    TKE    20x Blue    Matrix HS/quad       R LE SLS  1x30\"     Leg press 20x2 30# Added 4.26         Seated      LAQ 20x 5 lbs Added weight 4.26   Hamstring stretch                  Supine      SLR 15x      Quad set      SAQ 10 x 2      Manual tissue manipulation medial knee      Heel slides     Painful   Bridges 15x           Sidelying      Hip abduction 20x     Clamshells 20x                           Other:      Treatment Charges: Mins Units    []  Modalities      [x]  Ther Exercise 27 2 0848-6039   []  Manual Therapy      []  Ther Activities      []  Aquatics      [x]  Vasocompression 15 1 2995-6444   []  Other      Total Treatment time  43  mins 3    SciFit [id number]    Assessment: [x] Progressing toward goals. Good recall of exercises with min cueing for technique. Knee pain and popping noted with tap downs. Increased resistance during with 4 way hip for strengthening with good tolerance. Added weight with LAQ for quad focused strengthening. Ended with vaso compression to decrease pain and DOMS. [] No change. [] Other:     [x] Patient would continue to benefit from skilled physical therapy services in order to: improve right knee strength and ROM, improve gait, decrease risk of falls, improve stability and balance. STG: (to be met in 9 treatments)  1. ? Pain: Right knee pain improve to 3/10 at max during up/down steps  2. ? ROM: Right knee extension improve to 0 degrees with active extension  3. Right knee flexion improve to 115 degrees  4. ? Strength: Right knee strength improve to 4/5 (knee flex/ext) in sitting  5. ? Function: Patient able to go up/down steps reciprocally  6. Patient to be independent with home exercise program as demonstrated by performance with correct form without cues. 7. Patient able to return to work full duty                 Patient goals: \"Get better to return to work\"       Pt.  Education:  [x] Yes  [] No [x] Reviewed Prior HEP/Ed  Method of Education: [x] Verbal  [x] Demo  [] Written  Comprehension of Education:  [x] Verbalizes understanding. [x] Demonstrates understanding. [] Needs review. [x] Demonstrates/verbalizes HEP/Ed previously given. Plan: [x] Continue current frequency toward long and short term goals. [x] Specific Instructions for subsequent treatments: Balance exercises (heel-toe with eyes open/closed).  Resume leg press if able      Time In: 4:08 pm            Time Out: 4:56 pm    Electronically signed by:  Steffanie Aguilar PTA

## 2022-04-28 ENCOUNTER — HOSPITAL ENCOUNTER (OUTPATIENT)
Dept: PHYSICAL THERAPY | Age: 57
Setting detail: THERAPIES SERIES
Discharge: HOME | End: 2022-04-28
Payer: COMMERCIAL

## 2022-04-28 PROCEDURE — 97016 VASOPNEUMATIC DEVICE THERAPY: CPT

## 2022-04-28 PROCEDURE — 97110 THERAPEUTIC EXERCISES: CPT

## 2022-04-28 NOTE — FLOWSHEET NOTE
[x] [facility]) - [facility]  [facility]  [address]  [phone]  F: [phone] [] [address]  [place] [address]  P: [phone]  F: [phone] [] [address] &  Therapy  [address]  P: [phone]  F: [phone] [] [address]  P: [phone]  F: [phone] [] [address]  [facility]   [address]   [place]: [phone]  F: [phone]      Physical Therapy Daily Treatment Note    Date:  2022  Patient Name:  Brandie Morel    :  1965  MRN: [id number]  Physician: Dr. Tess Rutledge: Workers comp, 12 visits approved from 22 to 22  Medical Diagnosis: Sprain medial collateral ligament of right knee; sprain of right knee                 Rehab Codes: M 25.561, M 25.661, M 62.81, R 26.89, Z 91.81  Onset date: 2/10/22                 Next 's appt. Janae Everett as needed, Ortho 22    Visit# / total visits: 3/     Cancels/No Shows: 1/0     Subjective:    Pain:  [x] Yes  [] No  Location: R knee  Pain Rating: (0-10 scale) 2/10  Pain altered Tx:  [x] No  [] Yes  Action:  Comments: Patient reports no change in from last visits. Feels like therapy is not helping since he begun. Objective:  Modalities:  Vaso compression R knee elevated Mod pressure  @ 34 degrees x15 min--    Precautions:  Exercises:  All exercises performed with brace doffed  Exercise Reps/ Time Weight/ Level Comments   NuStep/ Stationary bike/  SciFit 5' Level 5 Increased resistance 22         Standing   Added weights 4.28   Incline stretch    3x30\"     Hamstring stretch 2x30\"     Heel raises    20x 2 lbs    Power step ups   15x ea 6\"  2 lbs Pain in knee noted   Lateral step ups 20x 4\"  2 lbs    Step downs    20x 6\"  2 lbs    Step taps    10x2 6\"  2 lbs Lateral, R CKC   3 way hip     15x ea 2 lbs    TKE    20x Blue    Matrix HS/quad       R LE SLS  1x30\"     Rebounder 2x20     Leg press 20x2 30# Added 4.26   Leg extension 10x 40 lbs Added 4.28   Leg curls 10x 40 lbs Added 4.28         Seated      LAQ  5 lbs Added weight 4.26   Hamstring stretch                  Supine      SLR       Quad set      SAQ       Manual tissue manipulation medial knee      Heel slides     Painful   Bridges            Sidelying      Hip abduction      Clamshells                            Other:      Treatment Charges: Mins Units    []  Modalities      [x]  Ther Exercise 31 2 5591-6431   []  Manual Therapy      []  Ther Activities      []  Aquatics      [x]  Vasocompression 15 1 [id number]   []  Other      Total Treatment time  46  mins 3    Zadego[employer] [id number]    Assessment: [x] Progressing toward goals. Added weights with standing exercises to increase LE strength with good progression . Increased box height with lateral step ups. SLS advanced to rebounder for joint stability with min sway. Leg curls and leg extension added for functional strength necessary for job requirements. Ended with vaso compression to R knee to decrease pain. [] No change. [] Other:     [x] Patient would continue to benefit from skilled physical therapy services in order to: improve right knee strength and ROM, improve gait, decrease risk of falls, improve stability and balance. STG: (to be met in 9 treatments)  1. ? Pain: Right knee pain improve to 3/10 at max during up/down steps  2. ? ROM: Right knee extension improve to 0 degrees with active extension  3. Right knee flexion improve to 115 degrees  4. ? Strength: Right knee strength improve to 4/5 (knee flex/ext) in sitting  5. ? Function: Patient able to go up/down steps reciprocally  6.  Patient to be independent with home exercise program as demonstrated by performance with correct form without cues. 7. Patient able to return to work full duty                 Patient goals: \"Get better to return to work\"       Pt. Education:  [x] Yes  [] No  [x] Reviewed Prior HEP/Ed  Method of Education: [x] Verbal  [x] Demo  [] Written  Comprehension of Education:  [x] Verbalizes understanding. [x] Demonstrates understanding. [] Needs review. [x] Demonstrates/verbalizes HEP/Ed previously given. Plan: [x] Continue current frequency toward long and short term goals. [x] Specific Instructions for subsequent treatments: Balance exercises (heel-toe with eyes open/closed).  Tandem, stairwell      Time In: 4:04 pm            Time Out: 4:55 pm    Electronically signed by:  Rai Guerrero PTA

## 2022-05-03 ENCOUNTER — HOSPITAL ENCOUNTER (OUTPATIENT)
Dept: PHYSICAL THERAPY | Age: 57
Setting detail: THERAPIES SERIES
Discharge: HOME | End: 2022-05-03
Payer: COMMERCIAL

## 2022-05-03 PROCEDURE — 97016 VASOPNEUMATIC DEVICE THERAPY: CPT

## 2022-05-03 PROCEDURE — 97110 THERAPEUTIC EXERCISES: CPT

## 2022-05-03 NOTE — FLOWSHEET NOTE
[x] [facility]) [facility] &  Therapy  [address]  [phone]  F: [phone] [] [address]  [place] [address]   [address]  P: [phone]  F: [phone] [] [place]  [address]  P: [phone]  F: [phone] [] [address]  P: [phone]  F: [phone] [] [address]  [facility]   [address]   [place]: [phone]  F: [phone]      Physical Therapy Daily Treatment Note    Date:  5/3/2022  Patient Name:  Candelario Saha    :  1965  MRN: [id number]  Physician: Dr. Santina Cowden: Workers comp, 12 visits approved from 22 to 22  Requested extension of end date on 5/3/22, OK per Miah Freeman to continue to see. Medical Diagnosis: Sprain medial collateral ligament of right knee; sprain of right knee                 Rehab Codes: M 25.561, M 25.661, M 62.81, R 26.89, Z 91.81  Onset date: 2/10/22                 Next 's appt. Kelvin Michael as needed, Ortho 22    Visit# / total visits:      Cancels/No Shows: 1/0     Subjective:    Pain:  [x] Yes  [] No  Location: R knee  Pain Rating: (0-10 scale) 1/10  Pain altered Tx:  [x] No  [] Yes  Action:  Comments: Upon arrival to therapy patient reports minimal pain in his R knee. Patient reports being HEP compliant. Patient reports tolerating last treatment session well with minimal soreness afterwards. Objective:  Modalities:  Vaso compression R knee elevated Mod pressure  @ 34 degrees x15 min--    Precautions:  Exercises:  All exercises performed with brace doffed  Exercise Reps/ Time Weight/ Level Comments   NuStep/ Stationary bike/  SciFit 5' Level 5 Increased resistance 22         Standing   Added weights 4.28   Incline stretch    3x30\"     Hamstring stretch 2x30\"     Heel raises    20x 2 lbs    Power step ups   20x ea 6\"  2 lbs Increased reps 5/3/22   Lateral step ups    20x 6\"  2 lbs Increased height 5/3/22   Step downs    20x 6\"  2 lbs    Step taps    10x2 6\"  2 lbs Lateral, R CKC   3 way hip     20x ea 2 lbs Increased reps 5/3/22   TKE    20x3'' Ram Increased resistance 5/3/22   Matrix HS/quad       R LE SLS  1x30\"     Rebounder 2x20     Leg press 20x2 40# Increased Resistance 5/3/22   Leg extension 20x 40 lbs Increased resistance 5/3/22   Leg curls 20x 40 lbs Added 4.28         Seated      LAQ  5 lbs Added weight 4.26   Hamstring stretch                  Supine      SLR       Quad set      SAQ       Manual tissue manipulation medial knee      Heel slides     Painful   Bridges            Sidelying      Hip abduction      Clamshells                            Other:      Treatment Charges: Mins Units    []  Modalities      [x]  Ther Exercise 33 2 4:12-4:45   []  Manual Therapy      []  Ther Activities      []  Aquatics      [x]  Vasocompression 15 1 4:46-5:01   []  Other      Total Treatment time  48  mins 3    Bike 4:07- 4:12    Assessment: [x] Progressing toward goals. Patient tolerated exercise progressions well this date without incident. Patient noted decreased knee discomfort during step exercises this date. Continued with vaso compression to R knee to decrease pain. [] No change. [] Other:     [x] Patient would continue to benefit from skilled physical therapy services in order to: improve right knee strength and ROM, improve gait, decrease risk of falls, improve stability and balance. STG: (to be met in 9 treatments)  ? Pain: Right knee pain improve to 3/10 at max during up/down steps  ? ROM: Right knee extension improve to 0 degrees with active extension  Right knee flexion improve to 115 degrees  ?  Strength: Right knee strength improve to 4/5 (knee flex/ext) in sitting  ? Function: Patient able to go up/down steps reciprocally  Patient to be independent with home exercise program as demonstrated by performance with correct form without cues. Patient able to return to work full duty                 Patient goals: \"Get better to return to work\"       Pt. Education:  [x] Yes  [] No  [x] Reviewed Prior HEP/Ed  Method of Education: [x] Verbal  [x] Demo  [] Written  Comprehension of Education:  [x] Verbalizes understanding. [x] Demonstrates understanding. [] Needs review. [x] Demonstrates/verbalizes HEP/Ed previously given. Plan: [x] Continue current frequency toward long and short term goals. [x] Specific Instructions for subsequent treatments: Balance exercises (heel-toe with eyes open/closed). Tandem, stairwell      Time In: 4:07 pm            Time Out: 4: pm    Electronically signed by:  Tarsha Otero SPTA  Treatment under the supervision and documentation reviewed by, Estephania Leal PTA.

## 2022-05-05 ENCOUNTER — HOSPITAL ENCOUNTER (OUTPATIENT)
Dept: PHYSICAL THERAPY | Age: 57
Setting detail: THERAPIES SERIES
Discharge: HOME | End: 2022-05-05
Payer: COMMERCIAL

## 2022-05-05 PROCEDURE — 97110 THERAPEUTIC EXERCISES: CPT

## 2022-05-05 NOTE — FLOWSHEET NOTE
[x] [facility]) [facility] &  Therapy  [address]  [phone]  F: [phone] [] [address]  [place] [address]   [address]  P: [phone]  F: [phone] [] [address] &  Therapy  [address]  P: [phone]  F: [phone] [] [address]  P: [phone]  F: [phone] [] [address]  [facility]   [address]   [place]: [phone]  F: [phone]      Physical Therapy Daily Treatment Note    Date:  2022  Patient Name:  Rolan Lynch    :  1965  MRN: [id number]  Physician: Dr. Hernando Regan: Workers comp, 12 visits approved from 22 to 6/3/22  Medical Diagnosis: Sprain medial collateral ligament of right knee; sprain of right knee                 Rehab Codes: M 25.561, M 25.661, M 62.81, R 26.89, Z 91.81  Onset date: 2/10/22                 Next 's appt. Rashard Rockwell as needed, Ortho 22    Visit# / total visits:      Cancels/No Shows: 1/0     Subjective:    Pain:  [x] Yes  [] No  Location: R knee  Pain Rating: (0-10 scale) 1/10  Pain altered Tx:  [x] No  [] Yes  Action:  Comments: Upon arrival to therapy patient continues to report minimal pain in his R knee. Patient reports being HEP compliant. Patient reports tolerating last treatment session well with minimal soreness afterwards. Patient noted that his biggest problem as of recently has been his L knee rather than his right, with reports of that it \"gives way and shayy\" sometimes. He noted that the pain was along the medial border of his L knee. Objective:  Modalities:  Vaso compression R knee elevated Mod pressure  @ 34 degrees x15 min--  Held 22  Precautions:  Exercises:  All exercises performed with brace doffed  Exercise Reps/ Time Weight/ Level Comments   NuStep/ Stationary bike/  SciFit 5' Level 5 Increased resistance 4/26/22         Standing   Added weights 4.28   Incline stretch    3x30\"     Hamstring stretch 2x30\"     Heel raises    20x 2 lbs    Power step ups   20x ea 8\"  2 lbs Increased height 5/5/22 *pain with L knee*   Lateral step ups    20x 8\"  2 lbs Increased height 5/5/22   Step downs    20x 6\"  2 lbs    Step taps    10x2 6\"  2 lbs Lateral, R CKC   4 way hip     20x ea lime Increased reps 5/3/22   TKE    20x3'' Ram Increased resistance 5/3/22   Matrix HS/quad       R LE SLS  1x30\"     Rebounder 2x20     Leg press 20x2 40# Increased Resistance 5/3/22   Leg extension 20x 40 lbs Increased resistance 5/3/22   Leg curls 20x 40 lbs Added 4.28   Cybex Resisted Walking x5  Fow/retro, Lat, Added 5/5/22   TG Squats  2x20  Added 5/5/22                     Seated      LAQ  5 lbs Added weight 4.26   Hamstring stretch                  Supine      SLR       Quad set      SAQ       Manual tissue manipulation medial knee      Heel slides     Painful   Bridges            Sidelying      Hip abduction      Clamshells                            Other:      Treatment Charges: Mins Units    []  Modalities      [x]  Ther Exercise 39 3 3:47-4:26   []  Manual Therapy      []  Ther Activities      []  Aquatics      []  Vasocompression      []  Other      Total Treatment time   39 min 3    Bike 3:42- 3:4:47    Assessment: [x] Progressing toward goals: Patient tolerated exercise progressions well this date without incident. Added Cybex Resisted walking and TG squats to increase LE functional strength. 3-way hip on LLE and side stepping to the left on cybex held this date secondary to L medial Knee pain noted by patient this date. Vaso compression held this date secondary to improved pain levels in patients R knee. [] No change.      [] Other:     [x] Patient would continue to benefit from skilled physical therapy services in order to: improve right knee strength and ROM, improve gait, decrease risk of falls, improve stability and balance. STG: (to be met in 9 treatments)  1. ? Pain: Right knee pain improve to 3/10 at max during up/down steps  2. ? ROM: Right knee extension improve to 0 degrees with active extension  3. Right knee flexion improve to 115 degrees  4. ? Strength: Right knee strength improve to 4/5 (knee flex/ext) in sitting  5. ? Function: Patient able to go up/down steps reciprocally  6. Patient to be independent with home exercise program as demonstrated by performance with correct form without cues. 7. Patient able to return to work full duty                 Patient goals: \"Get better to return to work\"       Pt. Education:  [x] Yes  [] No  [x] Reviewed Prior HEP/Ed  Method of Education: [x] Verbal  [x] Demo  [] Written  Comprehension of Education:  [x] Verbalizes understanding. [x] Demonstrates understanding. [] Needs review. [x] Demonstrates/verbalizes HEP/Ed previously given. Plan: [x] Continue current frequency toward long and short term goals. [x] Specific Instructions for subsequent treatments: Balance exercises (heel-toe with eyes open/closed). Tandem, stairwell      Time In: 3:47 pm            Time Out: 4:26 pm    Electronically signed by:  Brody Duran  Treatment under the supervision and documentation reviewed by, Lucero Lee PTA.

## 2022-05-10 ENCOUNTER — HOSPITAL ENCOUNTER (OUTPATIENT)
Dept: PHYSICAL THERAPY | Age: 57
Setting detail: THERAPIES SERIES
Discharge: HOME | End: 2022-05-10
Payer: COMMERCIAL

## 2022-05-10 NOTE — FLOWSHEET NOTE
[x] [address]  [address]    [phone]  F: [phone]   [] [address]  [place] [address]   [address]  P: [phone]  F: [phone]  [] [place]  [address] [facility]  P: [phone]  F: [phone] [] [address]  P: [phone]  F: [phone]  [] [address]  [address] [facility] [address]   [address]   [place]: [phone]  F: [phone]   [] [facility]  [address] [facility] [address]  [place]: [phone]   F: [phone]     Physical Therapy Cancel/No Show note    Date: 5/10/2022  Patient: Homar Bolanos  : 1965  MRN: [id number]    Cancels/No Shows to date:     For today's appointment patient:    [x]  Cancelled    [] Rescheduled appointment    [] No-show     Reason given by patient:    []  Patient ill    []  Conflicting appointment    [] No transportation      [x] Conflict with work    [] No reason given    [] Weather related    [] COVID-19    [x] Other:      Comments:  Patient called stating he was busy at work and needed to cx for today. Also stated he R knee is doing better and would like to DC at this time. Will be following up with his L knee pain.         [] Next appointment was confirmed    Electronically signed by: Chloé Lobato PTA

## 2022-05-11 NOTE — DISCHARGE SUMMARY
[x] [facility]) [facility] &  Therapy  [address]  [phone]  F: [phone] [] [address]  [facility]   [address]  P: [phone]  F: [phone] [] [address] &  Therapy  [address]  P: [phone]  F: [phone] [] [address]  P: [phone]  F: [phone] [] [address]  [facility]   [address]   [place]: [phone]  F: [phone]      Physical Therapy Discharge Note    Date: 5/10/2022      Patient: Claire Cramer  : 1965  MRN: [id number]: KELLY Velasco/Dr. Lawrence                                     Insurance: Workers comp, 12 visits approved from 22 to 6/3/22  Medical Diagnosis: Sprain medial collateral ligament of right knee; sprain of right knee                 Rehab Codes: M 25.561, M 25.661, M 62.81, R 26.89, Z 91.81  Onset date: 2/10/22                 Next 's appt. Shanell Strong as needed, Ortho 22    Visit# / total visits:    (to note: completed 8 visits previously)                               Cancels/No Shows:   Date of initial visit: 22                Date of final visit: 22    Subjective:    Pain:  [x]? Yes  []? No               Location: R knee                       Pain Rating: (0-10 scale) 1/10  Pain altered Tx:  [x]? No  []? Yes  Action:  Comments: Upon arrival to therapy patient continues to report minimal pain in his R knee. Patient reports being HEP compliant. Patient reports tolerating last treatment session well with minimal soreness afterwards. Patient noted that his biggest problem as of recently has been his L knee rather than his right, with reports of that it \"gives way and shayy\" sometimes.  He noted that the pain was along the medial border of his L knee. Objective:  Test Measurements:  Function:    Assessment:  STG: (to be met in 9 treatments)  1. ? Pain: Right knee pain improve to 3/10 at max during up/down steps -- Met, states injection pain is low, 1/10  2. ? ROM: Right knee extension improve to 0 degrees with active extension -- Improvement made to lacking 3 degrees  3. Right knee flexion improve to 115 degrees -- Improvement made to 112 degrees  4. ? Strength: Right knee strength improve to 4/5 (knee flex/ext) in sitting -- Met, 4/5 right knee strength since injection. 5. ? Function: Patient able to go up/down steps reciprocally -- Reports issues with left knee now - but steps is better with right leg. 6. Patient to be independent with home exercise program as demonstrated by performance with correct form without cues. -- Met  7. Patient able to return to work full duty -- unknown.                 Patient goals: \"Get better to return to work\"    Treatment to Date:  [x] Therapeutic Exercise    [] Modalities:  [] Therapeutic Activity    [] Ultrasound  [] Electrical Stimulation  [] Gait Training     [] Massage       [] Lumbar/Cervical Traction  [] Neuromuscular Re-education [] Cold/hotpack [] Iontophoresis: 4 mg/mL  [x] Instruction in Home Exercise Program                     Dexamethasone Sodium  [] Manual Therapy             Phosphate 40-80 mAmin  [] Aquatic Therapy                   [] Vasocompression/    [] Other:             Game Ready    Discharge Status:     [] Pt recovered from conditions. Treatment goals were met. [] Pt received maximum benefit. No further therapy indicated at this time. [x] Pt to continue exercise/home instructions independently. [] Therapy interrupted due to:    [] Pt has 2 or more no shows/cancels, is discontinued per our policy. [] Pt has completed prescribed number of treatment sessions. [x] Other: 5/10/22: \"Patient called stating he was busy at work and needed to cx for today.   Also stated he R knee is doing better and would like to DC at this time. Will be following up with his L knee pain. \"        Electronically signed by Daina Gaston PT on 5/10/2022 at 10:11 PM      If you have any questions or concerns, please don't hesitate to call.   Thank you for your referral.

## 2022-05-12 ENCOUNTER — APPOINTMENT (OUTPATIENT)
Dept: PHYSICAL THERAPY | Age: 57
End: 2022-05-12
Payer: COMMERCIAL

## 2022-07-06 ENCOUNTER — OFFICE VISIT (OUTPATIENT)
Dept: ORTHOPEDIC SURGERY | Age: 57
End: 2022-07-06
Payer: COMMERCIAL

## 2022-07-06 VITALS — HEIGHT: 72 IN | BODY MASS INDEX: 29.66 KG/M2 | WEIGHT: 219 LBS

## 2022-07-06 DIAGNOSIS — S83.411A SPRAIN OF MEDIAL COLLATERAL LIGAMENT OF RIGHT KNEE, INITIAL ENCOUNTER: ICD-10-CM

## 2022-07-06 DIAGNOSIS — M25.561 RIGHT KNEE PAIN, UNSPECIFIED CHRONICITY: Primary | ICD-10-CM

## 2022-07-06 PROCEDURE — 99213 OFFICE O/P EST LOW 20 MIN: CPT | Performed by: STUDENT IN AN ORGANIZED HEALTH CARE EDUCATION/TRAINING PROGRAM

## 2022-07-06 NOTE — PROGRESS NOTES
[address]  [address] [facility] [address]  Dept: [phone]  Dept Fax: [phone]        Orthopaedic Clinic Follow Up      Subjective:     Dusty Alvarenga is a 59 y.o. male who presents to the clinic today for routine followup regarding his right knee pain. Patient initially sustained injury when he stepped in a pothole at work. Patient was last seen on 4/7/2022 at which point he obtained a corticosteroid injection to the right knee and obtained a medial  brace for concern for sprain of the left medial collateral ligament of the right knee. Since his last visit, patient states that he has had some improvement but still notes pain medially when going up and down stairs. He also states that he has episodes of instability where he feels like his knee is giving out on him. Overall he states that his pain has improved from constant to a more intermittent pain. He states that the corticosteroid injection from his last visit provided him with approximately 1.5 months of relief but states that he feels like the injection is masking issue of the knee. He has been wearing his medial  brace consistently which does help him at work with stability. He has been participating in physical therapy and believes that he is getting benefit from this. He denies any numbness or tingling in the extremity. ROS:  Gen: No fevers/chills   Cardio: no chest pain   Lungs: no shortness of breath   MSK: pain in right knee   Neuro: no numbness, tingling, weakness     Objective :   Physical exam  Gen: AAOx3, no acute distress  Cardio: regular rate  Lungs: symmetrical chest expansion, no audible wheezing   MSK: Right lower extremity: No swelling or joint effusion today. ROM from 0-115 degrees without difficulty. No crepitus appreciated today. Mildly tender over medial knee but non tender laterally.  Stable varus/valgus stress test. Negative anterior/posterior drawer test. No pain with Francisco Javier evaluation. No pain with log roll. Sensation intact to light touch. Extremity warm and well perfused. Radiology:  No x-rays taken for today's visit     Assessment:   59y.o. year old male with the following:      Diagnosis Orders   1. Right knee pain, unspecified chronicity  External Referral To Physical Therapy   2. Sprain of medial collateral ligament of right knee, initial encounter  External Referral To Physical Therapy      Plan:      Patient seen and examined today. Discussed with the patient natural history and etiology of an MCL sprain. Discussed with patient that as he is continuing to improve, albeit slowly, with current regimen, we will continue this at this time. We discussed another corticosteroid injection today, as it has been approved by [facility], however that we did discuss that typically, we prefer to wait for 4 months in between injections. The patient stated that he was amenable to waiting an extra month for corticosteroid injection as this is our recommended timeframe for corticosteroid injections. In the meantime, he would like to continue physical therapy, which should continue to provide him with good relief and increase the stability in his right knee. We encouraged the patient to continue using his medial  brace to help with stability as well. We have also provided the patient with topical Voltaren to use on the medial aspect of the knee to help with inflammation. If the patient gets to the point where he is no longer getting relief, we may need to consider an MRI at that time to rule out any kind of meniscal pathology in the knee. Patient was amenable to this plan. We will see him back in about 1 month for reassessment. Follow up:No follow-ups on file.     Orders Placed This Encounter   Medications    diclofenac sodium (VOLTAREN) 1 % GEL     Sig: Apply 4 g topically 4 times daily Dispense:  480 g     Refill:  0          Orders Placed This Encounter   Procedures    External Referral To Physical Therapy     Referral Priority:   Routine     Referral Type:   Eval and Treat     Referral Reason:   Specialty Services Required     Requested Specialty:   Physical Therapist     Number of Visits Requested:   1       Electronically signed by Mayo Melendez DO on 7/6/2022 at 12:36 PM

## 2022-11-15 ENCOUNTER — HOSPITAL ENCOUNTER (OUTPATIENT)
Age: 57
Discharge: HOME | End: 2022-11-15

## 2022-11-15 ENCOUNTER — HOSPITAL ENCOUNTER (OUTPATIENT)
Dept: GENERAL RADIOLOGY | Age: 57
Discharge: HOME | End: 2022-11-17
Payer: COMMERCIAL

## 2022-11-15 DIAGNOSIS — R52 PAIN: ICD-10-CM

## 2022-11-15 PROCEDURE — 73630 X-RAY EXAM OF FOOT: CPT

## 2023-10-10 DIAGNOSIS — M25.561 RIGHT KNEE PAIN, UNSPECIFIED CHRONICITY: Primary | ICD-10-CM

## 2023-10-12 ENCOUNTER — OFFICE VISIT (OUTPATIENT)
Dept: ORTHOPEDIC SURGERY | Age: 58
End: 2023-10-12

## 2023-10-12 VITALS — HEIGHT: 72 IN | RESPIRATION RATE: 14 BRPM | WEIGHT: 220 LBS | BODY MASS INDEX: 29.8 KG/M2

## 2023-10-12 DIAGNOSIS — S83.411A SPRAIN OF MEDIAL COLLATERAL LIGAMENT OF RIGHT KNEE, INITIAL ENCOUNTER: ICD-10-CM

## 2023-10-12 DIAGNOSIS — M17.11 ARTHRITIS OF RIGHT KNEE: Primary | ICD-10-CM

## 2023-10-12 RX ORDER — METHYLPREDNISOLONE 4 MG/1
TABLET ORAL
Qty: 1 KIT | Refills: 0 | Status: SHIPPED | OUTPATIENT
Start: 2023-10-12

## 2023-10-12 ASSESSMENT — ENCOUNTER SYMPTOMS
SHORTNESS OF BREATH: 0
EYE DISCHARGE: 0
ABDOMINAL PAIN: 0
ROS SKIN COMMENTS: NEGATIVE FOR RASH

## 2023-10-12 NOTE — PROGRESS NOTES
[address] [facility] ORTHOPEDICS AND SPORTS MEDICINE  [address] [facility] [address]  Dept: [phone]  Dept Fax: [phone]        Ambulatory Follow Up      Subjective:   Rosetta Smiley is a 59y.o. year old male who presents to our office today for routine followup regarding his   1. Arthritis of right knee    2. Sprain of medial collateral ligament of right knee, initial encounter    . Chief Complaint   Patient presents with    Follow-up     Right knee pain       HPI  Phil Combs is a 23-year-old male who presents the office today for recheck of his right knee. He was last seen in the office on 7/6/2022. This is a [place] [employer]er's Compensation case. Date of injury was 6/18/2019. Patient has had previous steroid injections x2 and history of physical therapy as well as a medial  brace. He started to have worsening pain over the last few months presents the office today for further evaluation and treatment. He notes most of his pain is laterally and the brace seems to be making that lateral pain worse. He denies any new injuries. Review of Systems   Constitutional:  Positive for activity change. Negative for fever. HENT:  Negative for dental problem. Eyes:  Negative for discharge. Respiratory:  Negative for shortness of breath. Cardiovascular:  Negative for chest pain. Gastrointestinal:  Negative for abdominal pain. Genitourinary: Negative. Musculoskeletal:  Positive for arthralgias. Skin:         Negative for rash   Neurological:  Positive for weakness. Psychiatric/Behavioral:  Negative for confusion. I have reviewed the CC, HPI, ROS, PMH, FHX, Social History, and if not present in this note, I have reviewed in the patient's chart.    I agree with the documentation provided by other staff and have reviewed their documentation prior to providing my signature indicating

## 2023-11-13 ENCOUNTER — TELEPHONE (OUTPATIENT)
Dept: ORTHOPEDIC SURGERY | Age: 58
End: 2023-11-13

## 2023-11-13 NOTE — TELEPHONE ENCOUNTER
Patient called to check the status on request for Steroid injection through [facility].  Patient would like to be called back with update at [phone]

## 2023-11-15 NOTE — TELEPHONE ENCOUNTER
C-9 DENIAL for cortisone injection. \"Claim is allowed for sprain of right knee that is over a year and a half old and past resolution time frames. There has been no treatment in claim for over 1 year. \"

## 2025-07-21 ENCOUNTER — HOSPITAL ENCOUNTER (OUTPATIENT)
Age: 60
Setting detail: THERAPIES SERIES
Discharge: HOME | End: 2025-07-21
Payer: COMMERCIAL

## 2025-07-21 PROCEDURE — 97110 THERAPEUTIC EXERCISES: CPT

## 2025-07-21 PROCEDURE — 97161 PT EVAL LOW COMPLEX 20 MIN: CPT

## 2025-07-21 NOTE — CONSULTS
[x] [facility]  Outpatient Rehabilitation &  Therapy  [address]  [phone]  [phone] [] [facility]  Outpatient Rehabilitation &  Therapy  [address]  P: [phone]  F: [phone] [] [facility]  Outpatient Rehabilitation &  Therapy  [address]  P: [phone]  F: [phone] [] [facility]  Outpatient Rehabilitation &  Therapy  [address]  [phone]  [phone] [] [facility]  Outpatient Rehabilitation &  Therapy  [address]   P: [phone]  F: [phone]  [] [facility]  Outpatient Rehabilitation &  Therapy  [address]  P: [phone]  F: [phone] [] [facility]  Outpatient Rehabilitation &  Therapy  [address]  P: [phone]  F: [phone] [] [facility]  Outpatient Rehabilitation &  Therapy  [address]  P: [phone]  F: [phone] [] [facility]  Outpatient Rehabilitation &  Therapy  [address]  P: [phone]  F: [phone]  [] [facility] Outpatient Rehabilitation &  Therapy  [address]  P: [phone]  F: [phone]     Physical Therapy General Evaluation    Date:  2025  Patient: Dusty Sotelo  : 1965  MRN: [id number]  Physician: Raúl Lawrence MD     Insurance: [facility] limit of 4 units per visit   Medical Diagnosis:  S39.012A (ICD-10-CM) - Strain of muscle, fascia and tendon of lower back, initial encounter    Rehab Codes: M 25.651, M 25.652, M 54.9, M 54.59, M 79.1, M 62.830, R 29.3M 62.81  Onset Date: 25 (DOI)                                  Next 's appt: none    Subjective:   Pt c/o mid back pain since  when he pulled something in his back while working. Muscle relaxers and ibuprofen have helped. He had once incident

## 2025-07-28 ENCOUNTER — HOSPITAL ENCOUNTER (OUTPATIENT)
Age: 60
Setting detail: THERAPIES SERIES
Discharge: HOME | End: 2025-07-28
Payer: COMMERCIAL

## 2025-07-28 PROCEDURE — 97012 MECHANICAL TRACTION THERAPY: CPT

## 2025-07-28 PROCEDURE — 97110 THERAPEUTIC EXERCISES: CPT

## 2025-07-28 NOTE — FLOWSHEET NOTE
[x] [facility]  Outpatient Rehabilitation &  Therapy  [address] [place] [address]  [phone]  [phone]     Physical Therapy Daily Treatment Note    Date:  2025  Patient Name:  Dusty Sotelo      :  1965    MRN: [id number]  Physician: Raúl Lawrence MD                                  Insurance: [facility] limit of 4 units per visit   Medical Diagnosis:   S39.012A (ICD-10-CM) - Strain of muscle, fascia and tendon of lower back, initial encounter    Rehab Codes: M 25.651, M 25.652, M 54.9, M 54.59, M 79.1, M 62.830, R 29.3M 62.81  Onset Date: 25 (DOI)                 Next 's appt: none    Visit# / total visits: 2/   Cancels/No Shows: 0/0    Subjective:    Pain:  [x] Yes  [] No Location: mid low back   Pain Rating: (0-10 scale) 3/10  Pain altered Tx:  [x] No  [] Yes  Action:  Comments: Reports that low back pain has gradually come down throughout the day. Wakes up with more stiffness.     Objective:  Modalities:         Treatment Modality    Hot Pack:    [] Large   [] Medium    [] Cervical     _____ min    Cold Pack   [] Large   [] Medium    [] Cervical     _____ min    Electrical Stim:    [] IFC     [] MFAC      [] HVPC                          Protocol:_______  _____X_____min                          #Channels:  [] 1        [] 2       [] Other:    Traction:   [] Prone   [x] Supine   X _____15__min               [x] Lumbar x __70__lbs  max, 20 lbs min                   [] Continuous     [x] Intermittent  -   On: 60 sec x Off: 20 sec   1 Other:  Exercises         Precautions [] No  [] Yes:   Exercises:  Exercise Reps/ Time Weight/ Level Comments   SciFit 5 min Level 3          Standing      Calf Stretch on Incline Board 3x30\"     Hamstring Stretch on Step 3x30\" ea 12 inch    3 - Way Hip 15x ea  Bilaterally          Supine      LTR 10x3\" ea     PPT 10x5\"  Start with anterior tilt   Hip Flexor Stretch off EOM 3x20\" ea

## 2025-07-30 ENCOUNTER — HOSPITAL ENCOUNTER (OUTPATIENT)
Age: 60
Setting detail: THERAPIES SERIES
Discharge: HOME | End: 2025-07-30
Payer: COMMERCIAL

## 2025-07-30 PROCEDURE — 97110 THERAPEUTIC EXERCISES: CPT

## 2025-07-30 NOTE — FLOWSHEET NOTE
[x] [facility]  Outpatient Rehabilitation &  Therapy  [address] [place] [address]  [phone]  [phone]     Physical Therapy Daily Treatment Note    Date:  2025  Patient Name:  Dusty Sotelo      :  1965    MRN: [id number]  Physician: Raúl Lawrence MD                                  Insurance: [facility] limit of 4 units per visit   Medical Diagnosis:   S39.012A (ICD-10-CM) - Strain of muscle, fascia and tendon of lower back, initial encounter    Rehab Codes: M 25.651, M 25.652, M 54.9, M 54.59, M 79.1, M 62.830, R 29.3M 62.81  Onset Date: 25 (DOI)                 Next 's appt: none    Visit# / total visits: 3/9   Cancels/No Shows: 0/0    Subjective:    Pain:  [x] Yes  [] No Location: mid low back   Pain Rating: (0-10 scale) 2/10  Pain altered Tx:  [x] No  [] Yes  Action:  Comments: Pt states he was more sore after his last session than when he came in, just states it was muscle soreness. States his pain is usually worse in the morning but its okay today. States he has been doing his HEP every day at night, feels like his muscles are loosening up.     Objective:  Modalities:        Treatment Modality    Hot Pack:    [] Large   [] Medium    [] Cervical     _____ min    Cold Pack   [] Large   [] Medium    [] Cervical     _____ min    Electrical Stim:    [] IFC     [] MFAC      [] HVPC                          Protocol:_______  _____X_____min                          #Channels:  [] 1        [] 2       [] Other:    Traction:   [] Prone   [x] Supine   X _____15__min               [x] Lumbar x __70__lbs  max, 20 lbs min                   [] Continuous     [x] Intermittent  -   On: 60 sec x Off: 20 sec   1 Other:  did not have time          Precautions [] No  [] Yes:   Exercises:  Exercise Reps/ Time Weight/ Level Completed 25   Comments   SciFit 5 min Level 5 x Gathered subjective          Standing       Calf Stretch on Incline Board 3x30\"  x    Hamstring Stretch on

## 2025-08-01 ENCOUNTER — HOSPITAL ENCOUNTER (OUTPATIENT)
Age: 60
Setting detail: THERAPIES SERIES
Discharge: HOME | End: 2025-08-01
Payer: COMMERCIAL

## 2025-08-01 PROCEDURE — 97110 THERAPEUTIC EXERCISES: CPT

## 2025-08-01 NOTE — FLOWSHEET NOTE
[x] [facility]  Outpatient Rehabilitation &  Therapy  [address] [place] [address]  [phone]  [phone]     Physical Therapy Daily Treatment Note    Date:  2025  Patient Name:  Dusty Sotelo      :  1965    MRN: [id number]  Physician: Raúl Lawrence MD                                  Insurance: [facility] limit of 4 units per visit   Medical Diagnosis:   S39.012A (ICD-10-CM) - Strain of muscle, fascia and tendon of lower back, initial encounter    Rehab Codes: M 25.651, M 25.652, M 54.9, M 54.59, M 79.1, M 62.830, R 29.3M 62.81  Onset Date: 25 (DOI)                 Next 's appt: none    Visit# / total visits:    Cancels/No Shows: 0/0    Subjective:    Pain:  [x] Yes  [] No Location: mid low back   Pain Rating: (0-10 scale) 2/10  Pain altered Tx:  [x] No  [] Yes  Action:  Comments: Pt states his L knee has been bothering him, his back felt a little sore after last session but he laid down and put heat on his back which helped. States he has has low soreness in his back since.     Objective:  Modalities:        Treatment Modality    Hot Pack:    [] Large   [] Medium    [] Cervical     _____ min    Cold Pack   [] Large   [] Medium    [] Cervical     _____ min    Electrical Stim:    [] IFC     [] MFAC      [] HVPC                          Protocol:_______  _____X_____min                          #Channels:  [] 1        [] 2       [] Other:    Traction:   [] Prone   [x] Supine   X _____15__min               [x] Lumbar x __70__lbs  max, 20 lbs min                   [] Continuous     [x] Intermittent  -   On: 60 sec x Off: 20 sec   1 Other:  did not have time          Precautions [] No  [] Yes:   Exercises:  Exercise Reps/ Time Weight/ Level Completed 25   Comments   SciFit 5 min Level 5 x Gathered subjective          Standing       Calf Stretch on Incline Board 3x30\"  x    Calf raises 20x  x Added 8/   Hamstring Stretch on Step 3x30\" ea 12 inch     3 - Way Hip

## 2025-08-04 ENCOUNTER — HOSPITAL ENCOUNTER (OUTPATIENT)
Age: 60
Setting detail: THERAPIES SERIES
Discharge: HOME | End: 2025-08-04
Payer: COMMERCIAL

## 2025-08-04 PROCEDURE — 97140 MANUAL THERAPY 1/> REGIONS: CPT

## 2025-08-04 PROCEDURE — 97110 THERAPEUTIC EXERCISES: CPT

## 2025-08-06 ENCOUNTER — HOSPITAL ENCOUNTER (OUTPATIENT)
Age: 60
Setting detail: THERAPIES SERIES
Discharge: HOME | End: 2025-08-06
Payer: COMMERCIAL

## 2025-08-06 PROCEDURE — 97110 THERAPEUTIC EXERCISES: CPT

## 2025-08-06 PROCEDURE — 97140 MANUAL THERAPY 1/> REGIONS: CPT

## 2025-08-08 ENCOUNTER — HOSPITAL ENCOUNTER (OUTPATIENT)
Age: 60
Setting detail: THERAPIES SERIES
Discharge: HOME | End: 2025-08-08
Payer: COMMERCIAL

## (undated) DEVICE — DEVICE TRCR 12X9X3IN WHT CLSR DISP OMNICLOSE

## (undated) DEVICE — INSUFFLATION NEEDLE TO ESTABLISH PNEUMOPERITONEUM.: Brand: INSUFFLATION NEEDLE

## (undated) DEVICE — GLOVE ORANGE PI 7 1/2   MSG9075

## (undated) DEVICE — STERILE POLYISOPRENE POWDER-FREE SURGICAL GLOVES WITH EMOLLIENT COATING: Brand: PROTEXIS

## (undated) DEVICE — NEEDLE SPNL 18GA L3.5IN W/ QNCKE SHARPER BVL DURA CLICK

## (undated) DEVICE — SUTURE DEV SZ 2-0 WND CLSR ABSRB GS-22 VLOC COVIDIEN VLOCM2145

## (undated) DEVICE — ARM DRAPE

## (undated) DEVICE — COVER,LIGHT HANDLE,FLX,2/PK: Brand: MEDLINE INDUSTRIES, INC.

## (undated) DEVICE — MARKER,SKIN,WI/RULER AND LABELS: Brand: MEDLINE

## (undated) DEVICE — ADHESIVE SKIN CLOSURE TOP 36 CC HI VISC DERMBND MINI

## (undated) DEVICE — DISPOSABLE LAPAROSCOPIC CORDS, 1 PER POUCH: Brand: A&E MEDICAL / DISPOSABLE LAPAROSCOPIC CORDS

## (undated) DEVICE — Device

## (undated) DEVICE — SYRINGE, LUER LOCK, 30ML: Brand: MEDLINE

## (undated) DEVICE — GLOVE EXAM M L95IN FNGR THK35MIL PALM THK24MIL OFF WHT

## (undated) DEVICE — CANNULA SEAL

## (undated) DEVICE — PLUMEPORT SEO LAPAROSCOPIC SMOKE FILTRATION DEVICE: Brand: PLUMEPORT

## (undated) DEVICE — APPLICATOR MEDICATED 26 CC SOLUTION HI LT ORNG CHLORAPREP

## (undated) DEVICE — TOTAL TRAY, 16FR 10ML SIL FOLEY, URN: Brand: MEDLINE

## (undated) DEVICE — SCISSOR SURG METZ CRV TIP

## (undated) DEVICE — SOLUTION ANTIFOG VIS SYS CLEARIFY LAPSCP

## (undated) DEVICE — MITT SURG PREP L ADH DISPOSABLE

## (undated) DEVICE — TIP COVER ACCESSORY

## (undated) DEVICE — INTENDED FOR TISSUE SEPARATION, AND OTHER PROCEDURES THAT REQUIRE A SHARP SURGICAL BLADE TO PUNCTURE OR CUT.: Brand: BARD-PARKER ® CARBON RIB-BACK BLADES

## (undated) DEVICE — BLADELESS OBTURATOR: Brand: WECK VISTA

## (undated) DEVICE — TOWEL,OR,DSP,ST,NATURAL,DLX,4/PK,20PK/CS: Brand: MEDLINE

## (undated) DEVICE — GOWN,SIRUS,NONRNF,SETINSLV,XL,20/CS: Brand: MEDLINE

## (undated) DEVICE — GARMENT,MEDLINE,DVT,INT,CALF,MED, GEN2: Brand: MEDLINE

## (undated) DEVICE — AIRSEAL 8 MM ACCESS PORT AND LOW PROFILE OBTURATOR WITH BLADELESS OPTICAL TIP, 120 MM LENGTH: Brand: AIRSEAL

## (undated) DEVICE — TROCAR: Brand: KII FIOS FIRST ENTRY

## (undated) DEVICE — SUTURE V-LOC 180 SZ 0 L9IN ABSRB GRN GS-21 L37MM 1/2 CIR VLOCL0346

## (undated) DEVICE — Z DUPLICATE USE 2517136 APPLICATOR LAP 45 CM 2 FLX VISTASEAL

## (undated) DEVICE — GOWN,AURORA,NONREINFORCED,LARGE: Brand: MEDLINE

## (undated) DEVICE — BLADE CLIPPER GEN PURP NS

## (undated) DEVICE — TRI-LUMEN FILTERED TUBE SET WITH ACTIVATED CHARCOAL FILTER: Brand: AIRSEAL

## (undated) DEVICE — GLOVE ORANGE PI 7   MSG9070

## (undated) DEVICE — SEALANT TISS 4 CC FIBRIN VISTASEAL

## (undated) DEVICE — PROTECTOR ULN NRV PUR FOAM HK LOOP STRP ANATOMICALLY